# Patient Record
Sex: FEMALE | Race: WHITE | NOT HISPANIC OR LATINO | Employment: PART TIME | URBAN - METROPOLITAN AREA
[De-identification: names, ages, dates, MRNs, and addresses within clinical notes are randomized per-mention and may not be internally consistent; named-entity substitution may affect disease eponyms.]

---

## 2018-01-08 PROCEDURE — 87086 URINE CULTURE/COLONY COUNT: CPT | Performed by: NURSE PRACTITIONER

## 2018-01-11 ENCOUNTER — TELEPHONE (OUTPATIENT)
Dept: URGENT CARE | Facility: CLINIC | Age: 48
End: 2018-01-11

## 2018-01-12 ENCOUNTER — TELEPHONE (OUTPATIENT)
Dept: URGENT CARE | Facility: CLINIC | Age: 48
End: 2018-01-12

## 2018-01-12 NOTE — TELEPHONE ENCOUNTER
Ms. Weathers called stating she is having an increased burning sensation with urination and would like to know if she can take OTC Azo, advised her urine culture was negative if she is having symptoms she needs to be re-evaluated, she states she is not willing to come back in. Advised to follow up with her PCP or in another clinic if possible.

## 2018-01-13 ENCOUNTER — TELEPHONE (OUTPATIENT)
Dept: URGENT CARE | Facility: CLINIC | Age: 48
End: 2018-01-13

## 2018-01-13 NOTE — TELEPHONE ENCOUNTER
Pt called stated she was seen Monday for UTI prescribed antibiotic and diflucan. Pt stated she is still having symptoms and requesting two more diflucan. Informed Pt if she was still having symptoms she would need to come back in again to get re-elevated or follow- up with her PCP. Pt verbalized understanding no further questions.

## 2018-11-02 PROCEDURE — 87086 URINE CULTURE/COLONY COUNT: CPT | Performed by: NURSE PRACTITIONER

## 2018-11-05 ENCOUNTER — TELEPHONE (OUTPATIENT)
Dept: URGENT CARE | Facility: CLINIC | Age: 48
End: 2018-11-05

## 2018-11-05 NOTE — TELEPHONE ENCOUNTER
Pt stated was seen for sinus infection and a UTI prescribed cefdinir was experiencing nausea due to the medication and wanted to know if she could discontinue the medication. Informed Pt her urine culture was normal no bacterial growth but if her sinus symptoms were not improving it would be best to continue the medication and we could call in medication for the nausea. Pt stated that was fine medication escript to [facility] [address] No further questions

## 2018-11-26 ENCOUNTER — OFFICE VISIT (OUTPATIENT)
Dept: OBSTETRICS AND GYNECOLOGY | Facility: CLINIC | Age: 48
End: 2018-11-26

## 2018-11-26 ENCOUNTER — LAB REQUISITION (OUTPATIENT)
Dept: LAB | Facility: HOSPITAL | Age: 48
End: 2018-11-26

## 2018-11-26 ENCOUNTER — APPOINTMENT (OUTPATIENT)
Dept: LAB | Facility: HOSPITAL | Age: 48
End: 2018-11-26

## 2018-11-26 VITALS
HEIGHT: 63 IN | DIASTOLIC BLOOD PRESSURE: 70 MMHG | WEIGHT: 127 LBS | BODY MASS INDEX: 22.5 KG/M2 | SYSTOLIC BLOOD PRESSURE: 118 MMHG

## 2018-11-26 DIAGNOSIS — N95.1 MENOPAUSAL SYMPTOMS: ICD-10-CM

## 2018-11-26 DIAGNOSIS — Z12.11 COLON CANCER SCREENING: Primary | ICD-10-CM

## 2018-11-26 DIAGNOSIS — Z00.00 ROUTINE GENERAL MEDICAL EXAMINATION AT A HEALTH CARE FACILITY: ICD-10-CM

## 2018-11-26 PROBLEM — Z98.890 H/O LEEP: Status: ACTIVE | Noted: 2018-11-26

## 2018-11-26 LAB
ESTRADIOL SERPL HS-MCNC: 81 PG/ML
PROGEST SERPL-MCNC: 4.57 NG/ML
TESTOST SERPL-MCNC: 89.23 NG/DL (ref 0–780.1)

## 2018-11-26 PROCEDURE — 84144 ASSAY OF PROGESTERONE: CPT | Performed by: OBSTETRICS & GYNECOLOGY

## 2018-11-26 PROCEDURE — 36415 COLL VENOUS BLD VENIPUNCTURE: CPT | Performed by: OBSTETRICS & GYNECOLOGY

## 2018-11-26 PROCEDURE — 99202 OFFICE O/P NEW SF 15 MIN: CPT | Performed by: OBSTETRICS & GYNECOLOGY

## 2018-11-26 PROCEDURE — 84403 ASSAY OF TOTAL TESTOSTERONE: CPT | Performed by: OBSTETRICS & GYNECOLOGY

## 2018-11-26 PROCEDURE — 82626 DEHYDROEPIANDROSTERONE: CPT | Performed by: OBSTETRICS & GYNECOLOGY

## 2018-11-26 PROCEDURE — 82670 ASSAY OF TOTAL ESTRADIOL: CPT | Performed by: OBSTETRICS & GYNECOLOGY

## 2018-11-26 NOTE — PROGRESS NOTES
Subjective   Chief Complaint   Patient presents with   • Gynecologic Exam     needs compound hormone refill     Bia Weathers is a 48 y.o. year old .  No LMP recorded. Patient is postmenopausal.  She presents to be seen because of a need to get compounded hormones refill.  She reports is been an insurance change and she can no longer see Dr. Fuller.  She reports she's been on these for 7 years.  Gets labs checked every 3-6 months.  It's been about 6 months since A levels have been checked.  Last period was in .  She has not tried any shots in the past for pellets.  Maybe tried pills but was still very díaz.  Try to go off these for about 3 months but became evil and had night sweats.  So she'll like to stay on these.  Has had no bleeding.  Dr. Fuller adjust the dose depending on hormone levels.  I asked if her primary care Alicia recheck cholesterol thyroid etc and she reported that they send her to her GYN to get that done.  That may be for convenience sake if she's drawn all these other labs.  I will try to get those so we can have those in her chart.  No current complaints about any vaginal dryness or pain during intercourse.  Currently  does not smoke or use illegal drugs.  6 drinks a month.  No other medications besides a compounded hormones.                    The following portions of the patient's history were reviewed and updated as appropriate:problem list, current medications and allergies -   hormones as below; no known drug allergies  She reports that 2 of her sisters and her mother had colon polyps.  I think she needs to have a colonoscopy although she does not seem too excited about that idea/[place].  Discussed reduction hopefully of risks for colon cancer.  Mother has history of breast cancer diagnosed at age 61.  Her mother is about 16 years older than her.  Point surgery she recalls as a LEEP in about  for mild dysplasia 3 vaginal deliveries at term 7+ pounds between  "1990, 1994, 2003  Medical history review is negative ×7 positive for depression/anxiety    system review negative ×9 positive for headaches nosebleeds fatigue      Review of Systems No complaints of her urine but she reports that her stool is \"not normal\" upon questioning may be some mucus in her stool.      Objective   /70   Ht 158.8 cm (62.5\")   Wt 57.6 kg (127 lb)   BMI 22.86 kg/m²     General:  well developed; well nourished  no acute distress  appears stated age   Skin:  No suspicious lesions seen   Thyroid: not examined   Lungs:  breathing is unlabored   Heart:  Not performed.   Abdomen: Not performed.   Pelvis: Not performed.     Lab Review   No data reviewed    Imaging   No data reviewed       Assessment   1. Menopausal symptoms. Much worse off compounded estrogen and progesterone testosterone DHEA.   Patient requests refill of estrogen 1 mg/progesterone 50 mg/testosterone 2. 5/DHEA #15 g this compounded at the [facility] in [facility].    2. Family history of polyps needs to have colonoscopy for starters.   3. Family history breast cancer in her mother diagnosed at age 61.  Patient reports she is up-to-date on mammograms done this year [facility].  4.   Reports she had a Pap test earlier this year 2018       Plan   1. I have handwritten a prescription for the above cream that she feels that the [facility] and [facility].  One refill   2.  see her back in 6 months for annual GYN exam  3. Check estrogen and progesterone testosterone levels;  sign a release of information to get labs have been done at primary care and Dr. Fuller.  4. Referral to gastroenterology for probable colonoscopy given family history and screening     Medications Rx this encounter:  No orders of the defined types were placed in this encounter.         This note was electronically signed.    Siddhartha Mcdermott MD  November 26, 2018    "

## 2018-11-28 ENCOUNTER — TELEPHONE (OUTPATIENT)
Dept: URGENT CARE | Facility: CLINIC | Age: 48
End: 2018-11-28

## 2018-11-29 ENCOUNTER — TELEPHONE (OUTPATIENT)
Dept: OBSTETRICS AND GYNECOLOGY | Facility: CLINIC | Age: 48
End: 2018-11-29

## 2018-11-29 LAB — DHEA SERPL-MCNC: 234 NG/DL (ref 31–701)

## 2018-11-29 RX ORDER — TIZANIDINE HYDROCHLORIDE 4 MG/1
1 CAPSULE, GELATIN COATED ORAL EVERY 8 HOURS SCHEDULED
COMMUNITY
Start: 2018-10-16 | End: 2019-07-22

## 2018-11-29 NOTE — TELEPHONE ENCOUNTER
----- Message from Siddhartha Mcdermott MD sent at 11/29/2018  3:23 PM EST -----  Rebecca please let Bia no her labs are okay.  I forgot to remove the progesterone from her compounded cream that's filled with a [facility] in [place].  Could you call them and let them know the progesterone from her estrogen/testosterone/DHEA cream.  Thank you

## 2018-11-29 NOTE — PROGRESS NOTES
Rebecca please let Bia no her labs are okay.  I forgot to remove the progesterone from her compounded cream that's filled with a [facility] in [place].  Could you call them and let them know the progesterone from her estrogen/testosterone/DHEA cream.  Thank you

## 2018-11-29 NOTE — TELEPHONE ENCOUNTER
Called and spoke with patient and advised her that I would verbally call in her hormone cream without the progesterone

## 2019-01-03 RX ORDER — SODIUM, POTASSIUM,MAG SULFATES 17.5-3.13G
1 SOLUTION, RECONSTITUTED, ORAL ORAL TAKE AS DIRECTED
Qty: 2 BOTTLE | Refills: 0 | Status: SHIPPED | OUTPATIENT
Start: 2019-01-03 | End: 2019-07-22

## 2019-07-22 ENCOUNTER — OFFICE VISIT (OUTPATIENT)
Dept: OBSTETRICS AND GYNECOLOGY | Facility: CLINIC | Age: 49
End: 2019-07-22

## 2019-07-22 VITALS
BODY MASS INDEX: 24.45 KG/M2 | RESPIRATION RATE: 16 BRPM | DIASTOLIC BLOOD PRESSURE: 70 MMHG | WEIGHT: 138 LBS | SYSTOLIC BLOOD PRESSURE: 116 MMHG

## 2019-07-22 DIAGNOSIS — N95.1 MENOPAUSAL SYMPTOMS: Primary | ICD-10-CM

## 2019-07-22 DIAGNOSIS — Z12.39 SCREENING FOR BREAST CANCER: ICD-10-CM

## 2019-07-22 PROBLEM — Z98.890 H/O LEEP: Status: RESOLVED | Noted: 2018-11-26 | Resolved: 2019-07-22

## 2019-07-22 PROCEDURE — 99213 OFFICE O/P EST LOW 20 MIN: CPT | Performed by: OBSTETRICS & GYNECOLOGY

## 2019-07-22 RX ORDER — ESTRADIOL 1 MG/1
1 TABLET ORAL DAILY
Qty: 30 TABLET | Refills: 3 | Status: SHIPPED | OUTPATIENT
Start: 2019-07-22 | End: 2019-11-08 | Stop reason: SDUPTHER

## 2019-07-22 RX ORDER — LURASIDONE HYDROCHLORIDE 80 MG/1
80 TABLET, FILM COATED ORAL 2 TIMES DAILY
COMMUNITY
End: 2019-11-19

## 2019-07-22 RX ORDER — DOXEPIN HYDROCHLORIDE 25 MG/1
25 CAPSULE ORAL NIGHTLY
COMMUNITY
End: 2019-11-19

## 2019-07-22 RX ORDER — BUSPIRONE HYDROCHLORIDE 10 MG/1
10 TABLET ORAL 3 TIMES DAILY
COMMUNITY
End: 2019-11-19

## 2019-07-22 NOTE — PROGRESS NOTES
"Subjective   Chief Complaint   Patient presents with   • Follow-up     hormones   • Hot Flashes     trouble sleeping / hormone cream not working     iBa Weathers is a 48 y.o. year old .  No LMP recorded. Patient is postmenopausal.  She presents to be seen because of terrible hot flashes \"on fire\" night sweats - cant sleep every hour last few seconds clothes wet - cant change a work wlaks around wet   She stopped creams 3 months ago gave up $$ and not helping; effecting ADL. 9 years of menopause   Active with House[facility] at [facility]     No LMP recorded. Patient is postmenopausal.                              The following portions of the patient's history were reviewed and updated as appropriate:She  has a past medical history of Depression and Hypertension.  She does not have any pertinent problems on file.  She  has a past surgical history that includes LEEP ().  Her family history is not on file.  She is allergic to amoxicillin.    Current Outpatient Medications:   •  amphetamine-dextroamphetamine (ADDERALL) 20 MG tablet, Take 20 mg by mouth Daily., Disp: , Rfl:   •  busPIRone (BUSPAR) 10 MG tablet, Take 10 mg by mouth 3 (Three) Times a Day., Disp: , Rfl:   •  doxepin (SINEquan) 25 MG capsule, Take 25 mg by mouth Every Night., Disp: , Rfl:   •  Lurasidone HCl (LATUDA) 80 MG tablet, Take 80 mg by mouth 2 (Two) Times a Day., Disp: , Rfl:   •  topiramate (TOPAMAX) 200 MG tablet, Take  by mouth., Disp: , Rfl:   •  estradiol (ESTRACE) 1 MG tablet, Take 1 tablet by mouth Daily., Disp: 30 tablet, Rfl: 3  •  progesterone (PROMETRIUM) 200 MG capsule, Take 1 capsule by mouth Daily., Disp: 30 capsule, Rfl: 3  no or minimal alcohol, nonsmoker, caffeine use is moderate-to-high daily ~ 3 teas and brigitte   Review of Systems      Objective   /70   Resp 16   Wt 62.6 kg (138 lb)   Breastfeeding? No   BMI 24.45 kg/m²     General:  well developed; well nourished  no acute distress  appears stated age   Skin:  No " suspicious lesions seen   Thyroid: normal to inspection and palpation   Lungs:  breathing is unlabored   Heart:  Not performed.   Abdomen: soft, non-tender; no masses  no umbilical or inguinal hernias are present  no hepato-splenomegaly   Pelvis: Not performed.     Lab Review   FSH and hormones     Imaging   No data reviewed       Assessment   1. Menopausal symptoms interfering ADL   2.   3.        Plan   1.start HRT for symptoms  2.      No orders of the defined types were placed in this encounter.    New Medications Ordered This Visit   Medications   • estradiol (ESTRACE) 1 MG tablet     Sig: Take 1 tablet by mouth Daily.     Dispense:  30 tablet     Refill:  3   • progesterone (PROMETRIUM) 200 MG capsule     Sig: Take 1 capsule by mouth Daily.     Dispense:  30 capsule     Refill:  3            I spent a total of 15 minutes face-to-face with this patient and greater than half the time was in counseling the patient.  This note was electronically signed.    Siddhartha Mcdermott MD  July 22, 2019

## 2019-11-08 ENCOUNTER — TELEPHONE (OUTPATIENT)
Dept: OBSTETRICS AND GYNECOLOGY | Facility: CLINIC | Age: 49
End: 2019-11-08

## 2019-11-08 RX ORDER — ESTRADIOL 1 MG/1
1 TABLET ORAL DAILY
Qty: 30 TABLET | Refills: 0 | Status: SHIPPED | OUTPATIENT
Start: 2019-11-08 | End: 2019-11-11 | Stop reason: SDUPTHER

## 2019-11-08 NOTE — TELEPHONE ENCOUNTER
Pt is calling progesterone and her estradiol needs refill today  - I advised possible would be Monday - she advised she is out - I then again told her how important it is to not wait - possibly when she has a week left to call and advise

## 2019-11-11 RX ORDER — PROPRANOLOL HYDROCHLORIDE 20 MG/1
TABLET ORAL
COMMUNITY
Start: 2019-10-19 | End: 2022-01-27

## 2019-11-11 RX ORDER — ESTRADIOL 1 MG/1
1 TABLET ORAL DAILY
Qty: 30 TABLET | Refills: 0 | Status: SHIPPED | OUTPATIENT
Start: 2019-11-11 | End: 2019-11-19 | Stop reason: SDUPTHER

## 2019-11-11 RX ORDER — DEXTROAMPHETAMINE SACCHARATE, AMPHETAMINE ASPARTATE, DEXTROAMPHETAMINE SULFATE AND AMPHETAMINE SULFATE 7.5; 7.5; 7.5; 7.5 MG/1; MG/1; MG/1; MG/1
1 TABLET ORAL 2 TIMES DAILY
Refills: 0 | COMMUNITY
Start: 2019-10-17

## 2019-11-11 RX ORDER — TOPIRAMATE 100 MG/1
TABLET, FILM COATED ORAL
Refills: 2 | COMMUNITY
Start: 2019-09-21 | End: 2019-11-19

## 2019-11-11 RX ORDER — BUSPIRONE HYDROCHLORIDE 30 MG/1
TABLET ORAL
Refills: 3 | COMMUNITY
Start: 2019-09-23 | End: 2020-11-20

## 2019-11-11 RX ORDER — CAPSAICIN 0.025 %
CREAM (GRAM) TOPICAL
Refills: 1 | COMMUNITY
Start: 2019-08-06 | End: 2019-11-19

## 2019-11-11 RX ORDER — CYCLOSPORINE 0.5 MG/ML
EMULSION OPHTHALMIC
COMMUNITY
Start: 2019-10-21 | End: 2020-11-20

## 2019-11-11 RX ORDER — LORATADINE 10 MG/1
TABLET ORAL DAILY
Refills: 2 | COMMUNITY
Start: 2019-09-14 | End: 2019-11-14 | Stop reason: SDUPTHER

## 2019-11-11 RX ORDER — IBUPROFEN 800 MG/1
TABLET ORAL
COMMUNITY
Start: 2019-10-19 | End: 2019-11-19

## 2019-11-11 RX ORDER — GABAPENTIN 300 MG/1
CAPSULE ORAL
Refills: 1 | COMMUNITY
Start: 2019-09-16 | End: 2019-11-19

## 2019-11-11 RX ORDER — TRIAMCINOLONE ACETONIDE 1 MG/G
CREAM TOPICAL
Refills: 0 | COMMUNITY
Start: 2019-09-24 | End: 2019-11-19

## 2019-11-19 ENCOUNTER — OFFICE VISIT (OUTPATIENT)
Dept: OBSTETRICS AND GYNECOLOGY | Facility: CLINIC | Age: 49
End: 2019-11-19

## 2019-11-19 VITALS
BODY MASS INDEX: 23.04 KG/M2 | HEIGHT: 63 IN | DIASTOLIC BLOOD PRESSURE: 70 MMHG | WEIGHT: 130 LBS | SYSTOLIC BLOOD PRESSURE: 108 MMHG

## 2019-11-19 DIAGNOSIS — Z01.419 ENCOUNTER FOR GYNECOLOGICAL EXAMINATION WITHOUT ABNORMAL FINDING: Primary | ICD-10-CM

## 2019-11-19 DIAGNOSIS — N94.10 DYSPAREUNIA, FEMALE: ICD-10-CM

## 2019-11-19 DIAGNOSIS — F52.0 HYPOACTIVE SEXUAL DESIRE DISORDER: ICD-10-CM

## 2019-11-19 PROCEDURE — 99396 PREV VISIT EST AGE 40-64: CPT | Performed by: OBSTETRICS & GYNECOLOGY

## 2019-11-19 RX ORDER — ESTRADIOL 1 MG/1
1 TABLET ORAL DAILY
Qty: 30 TABLET | Refills: 11 | Status: SHIPPED | OUTPATIENT
Start: 2019-11-19 | End: 2020-11-20 | Stop reason: SDUPTHER

## 2019-11-19 RX ORDER — LURASIDONE HYDROCHLORIDE 120 MG/1
TABLET, FILM COATED ORAL
Refills: 2 | COMMUNITY
Start: 2019-08-29 | End: 2022-05-31

## 2019-11-19 RX ORDER — PHENOL 1.4 %
600 AEROSOL, SPRAY (ML) MUCOUS MEMBRANE DAILY
COMMUNITY
End: 2020-11-20

## 2019-11-19 RX ORDER — ESTRADIOL 0.1 MG/G
CREAM VAGINAL
Qty: 1 EACH | Refills: 4 | Status: SHIPPED | OUTPATIENT
Start: 2019-11-19 | End: 2020-11-20

## 2019-11-19 NOTE — PROGRESS NOTES
Subjective   Chief Complaint   Patient presents with   • Annual Exam     no sex drive     Bia Badillo is a 49 y.o. year old  menopausal female presenting to be seen for her annual exam.  There has not been vaginal bleeding in the last 12 months.  Hot flashes and night sweats are not a significant problem.    SEXUAL Hx:  She is sexually active.  Vaginal dryness is a problem.  [place] is painful:yes she is with her ex- off and on; she is concerned with lack of sexual desire - only person she would have sex with   She is very concerned with this.  Told her that she may need testosterone and it is not always effective.  I would like to treat the painful intercourse as if her intercourse hurts she may be less interested as well.  This seems to be a big problem for her although she is not currently regularly sexually active or with her  on full-time basis.  She has concerns about domestic violence: no    HEALTH Hx:  She exercises regularly: yes.  She wears her seat belt:yes.  Self breast awareness: yes  She has noticed changes in height: no              Calcium intake is adequate 4 daily              Caffeine intake: no or mild caffeine use    The following portions of the patient's history were reviewed and updated as appropriate:problem list, current medications, allergies, past family history, past medical history, past social history and past surgical history.    Current Outpatient Medications:   •  amphetamine-dextroamphetamine (ADDERALL) 30 MG tablet, Take 1 tablet by mouth 2 (Two) Times a Day., Disp: , Rfl: 0  •  busPIRone (BUSPAR) 30 MG tablet, , Disp: , Rfl: 3  •  calcium carbonate (OS-CHARLES) 600 MG tablet, Take 600 mg by mouth Daily., Disp: , Rfl:   •  cephalexin (KEFLEX) 500 MG capsule, Take 1 capsule by mouth 3 (Three) Times a Day for 7 days., Disp: 21 capsule, Rfl: 0  •  cetirizine (zyrTEC) 10 MG tablet, Take 1 tablet by mouth Daily for 30 days., Disp: 30 tablet, Rfl: 0  •   "estradiol (ESTRACE) 1 MG tablet, Take 1 tablet by mouth Daily., Disp: 30 tablet, Rfl: 11  •  fluconazole (DIFLUCAN) 150 MG tablet, May take 1 tablet now. May take 2nd tablet in 3 days if symptoms persist., Disp: 4 tablet, Rfl: 0  •  FLUTICASONE PROPIONATE HFA IN, SHAKE LQ AND U 2 SPRAYS IEN QD, Disp: , Rfl: 2  •  LATUDA 120 MG tablet, TK 1 T PO QD AT 9 PM, Disp: , Rfl: 2  •  progesterone (PROMETRIUM) 200 MG capsule, Take 1 capsule by mouth Daily., Disp: 30 capsule, Rfl: 11  •  propranolol (INDERAL) 20 MG tablet, , Disp: , Rfl:   •  RESTASIS 0.05 % ophthalmic emulsion, , Disp: , Rfl:   •  topiramate (TOPAMAX) 200 MG tablet, Take  by mouth., Disp: , Rfl:   •  estradiol (ESTRACE VAGINAL) 0.1 MG/GM vaginal cream, Insert 1 gm intravaginally 2 nights weekly, Disp: 1 each, Rfl: 4    Social History    Tobacco Use      Smoking status: Never Smoker      Smokeless tobacco: Never Used    Social History     Substance and Sexual Activity   Alcohol Use Yes   • Frequency: Monthly or less       Review of systems  Constitutional   POS nothing reported                           NEG fevers, malaise and night sweats  Breast               POS nothing reported                           NEG persistent breast lump, skin dimpling or nipple discharge  GI                     POS nothing reported                           NEG bloating, change in bowel habits, melena or reflux symptoms                      POS occasionally has to strain to void                           NEG dysuria, frequency or hematuria         Objective   /70   Ht 160 cm (63\")   Wt 59 kg (130 lb)   Breastfeeding? No   BMI 23.03 kg/m²      General:  well developed; well nourished  no acute distress  appears stated age   Skin:  No suspicious lesions seen   Thyroid: normal to inspection and palpation   Breasts:  Examined in supine position  Symmetric without masses or skin dimpling  Nipples normal without inversion, lesions or discharge  Bilateral implants are noted " without obvious palpable abnormalities   Abdomen: soft, non-tender; no masses  no umbilical or inguinal hernias are present  no hepato-splenomegaly   Pelvis: Clinical staff was present for exam  External genitalia:  normal appearance of the external genitalia including Bartholin's and [place]'s glands.  :  urethral meatus normal;  Vaginal:  normal pink mucosa without prolapse or lesions.  Uterus:  normal size, shape and consistency.  Adnexa:  non palpable bilaterally.       Lab Review   Estradiol DHEA testosterone levels  Imaging review  No data reviewed         Assessment   1. Postmenopausal symptoms have improved on Estrace and Prometrium.  However she has  2. Hypoactive sexual desire discussed that this is a complicated matter with no simple cure   3. Dyspareunia due to vaginal dryness we will treat with addition of Estrace cream twice weekly  4. Family history of breast cancer her mother need       Plan   1. Annual or sooner as needed  2. Calcium discussed  1200 mg daily in divided doses ideally in diet  3. Regular weight bearing exercise  4. Breast self awareness, mammograms discussed  5. We will see how the Estrace cream helps as far as painful intercourse.    New Medications Ordered This Visit   Medications   • estradiol (ESTRACE) 1 MG tablet     Sig: Take 1 tablet by mouth Daily.     Dispense:  30 tablet     Refill:  11   • progesterone (PROMETRIUM) 200 MG capsule     Sig: Take 1 capsule by mouth Daily.     Dispense:  30 capsule     Refill:  11   • estradiol (ESTRACE VAGINAL) 0.1 MG/GM vaginal cream     Sig: Insert 1 gm intravaginally 2 nights weekly     Dispense:  1 each     Refill:  4      No orders of the defined types were placed in this encounter.             This note was electronically signed.    Siddhartha Mcdermott M.D.  November 19, 2019

## 2020-01-10 ENCOUNTER — TELEPHONE (OUTPATIENT)
Dept: URGENT CARE | Facility: CLINIC | Age: 50
End: 2020-01-10

## 2020-03-03 ENCOUNTER — TELEPHONE (OUTPATIENT)
Dept: OBSTETRICS AND GYNECOLOGY | Facility: CLINIC | Age: 50
End: 2020-03-03

## 2020-03-03 NOTE — TELEPHONE ENCOUNTER
"If she is using Estrace and Prometrium on a regular basis then her hormones are not \"out of balance \"and there is really no need to do blood work if she is not having hot flashes night sweats etc."

## 2020-03-03 NOTE — TELEPHONE ENCOUNTER
PT IS CALLING SHE HAS BEEN GOING TO THE DENTIST  THE DENTIST SEEMS TO THINK THAT NICOLE'S HORMONES ARE OUT OF BALANCE  THIS BEING THE REASON FOR SOME OF HER TEETH ISSUES- SHE IS WANTING THE PATIENT TO GET HER LAB WORK DONE BEFORE HER NEXT VISIT - - CAN DR LUCIANO DO THIS FOR HER?    IF THIS IS OK - CAN YOU PLEASE CALL NICOLE AND  LET HER KNOW IF OK AND SHE WILL GO .  [phone]

## 2020-03-20 ENCOUNTER — TELEPHONE (OUTPATIENT)
Dept: OBSTETRICS AND GYNECOLOGY | Facility: CLINIC | Age: 50
End: 2020-03-20

## 2020-03-20 NOTE — TELEPHONE ENCOUNTER
Patient has gained 20 pounds in the last 6 months. On estrace and progesterone.  Could the hormones cause this.  Blood pressure is up.

## 2020-09-15 ENCOUNTER — TELEPHONE (OUTPATIENT)
Dept: OBSTETRICS AND GYNECOLOGY | Facility: CLINIC | Age: 50
End: 2020-09-15

## 2020-09-15 NOTE — TELEPHONE ENCOUNTER
PT IS CALLING CAME TO YOU AND WAS ON A HORMONAL CREAM CLICKER THAT SHE WOUILD RUB INTO HER WRIST - SHE HAD DECIDED WANTED TO TRY THE PILL - SO WE PUT HER ON THE PILL- SHE IS NOW DECIDED THAT SHE WANTS TO GO BACK ON THE CREAM ---- THE SAME CREAM THAT SHE WAS ON - NOTING NEW - THE CLICKER FOR THE WRIST CREAM  PLEASE CALL THIS INTO THE COMPOUNDED PHARMACY -  [facility]

## 2020-09-15 NOTE — TELEPHONE ENCOUNTER
Estradiol 1 mg  Testosterone 2.5 mg  Dhea 20 mg applying at bedtime mixed in a cream and applied at qhs.

## 2020-09-15 NOTE — TELEPHONE ENCOUNTER
Could you please find out whether this is testosterone /estrogen/ progesterone what exactly it was.  Thank you

## 2020-09-15 NOTE — TELEPHONE ENCOUNTER
I have not seen her in over a year so I cannot prescribe this medication without at least a video visit or telephone visit.  In person would be required eventually.  Looks like she has it scheduled in November.  Thank you

## 2020-11-20 ENCOUNTER — OFFICE VISIT (OUTPATIENT)
Dept: OBSTETRICS AND GYNECOLOGY | Facility: CLINIC | Age: 50
End: 2020-11-20

## 2020-11-20 VITALS
HEIGHT: 63 IN | DIASTOLIC BLOOD PRESSURE: 70 MMHG | BODY MASS INDEX: 25.52 KG/M2 | SYSTOLIC BLOOD PRESSURE: 114 MMHG | WEIGHT: 144 LBS

## 2020-11-20 DIAGNOSIS — Z12.31 ENCOUNTER FOR SCREENING MAMMOGRAM FOR MALIGNANT NEOPLASM OF BREAST: ICD-10-CM

## 2020-11-20 DIAGNOSIS — Z01.419 ENCOUNTER FOR WELL WOMAN EXAM WITH ROUTINE GYNECOLOGICAL EXAM: Primary | ICD-10-CM

## 2020-11-20 PROCEDURE — 99396 PREV VISIT EST AGE 40-64: CPT | Performed by: OBSTETRICS & GYNECOLOGY

## 2020-11-20 RX ORDER — CHLORZOXAZONE 500 MG/1
TABLET ORAL
COMMUNITY
Start: 2020-10-19

## 2020-11-20 RX ORDER — DICYCLOMINE HCL 20 MG
TABLET ORAL
COMMUNITY
Start: 2020-11-08 | End: 2021-09-23

## 2020-11-20 RX ORDER — ESTRADIOL 1 MG/1
1 TABLET ORAL DAILY
Qty: 30 TABLET | Refills: 11 | Status: SHIPPED | OUTPATIENT
Start: 2020-11-20 | End: 2022-02-04 | Stop reason: SDUPTHER

## 2020-11-20 RX ORDER — NABUMETONE 500 MG/1
TABLET, FILM COATED ORAL
COMMUNITY
Start: 2020-10-16 | End: 2021-09-23

## 2020-11-20 RX ORDER — CALCIUM POLYCARBOPHIL 625 MG/1
TABLET, FILM COATED ORAL DAILY
COMMUNITY
Start: 2020-11-06 | End: 2021-09-23

## 2020-11-20 RX ORDER — DEXTROAMPHETAMINE SACCHARATE, AMPHETAMINE ASPARTATE, DEXTROAMPHETAMINE SULFATE AND AMPHETAMINE SULFATE 2.5; 2.5; 2.5; 2.5 MG/1; MG/1; MG/1; MG/1
TABLET ORAL
COMMUNITY
Start: 2020-10-16

## 2020-11-20 RX ORDER — BUPROPION HYDROCHLORIDE 150 MG/1
TABLET ORAL
COMMUNITY
Start: 2020-10-29 | End: 2021-09-23

## 2020-11-20 RX ORDER — TOPIRAMATE 100 MG/1
TABLET, FILM COATED ORAL
COMMUNITY
Start: 2020-09-07 | End: 2021-09-23

## 2020-11-20 NOTE — PROGRESS NOTES
Subjective   Chief Complaint   Patient presents with   • Annual Exam     cramping   • Constipation     Bia Badillo is a 50 y.o. year old  menopausal female presenting to be seen for her annual exam.  There has not been vaginal bleeding in the last 12 months.  Hot flashes and night sweats are not a significant problem.  Since we started her on Estrace and Prometrium.    Upon review she has never had a colonoscopy and upon questioning 2 younger sisters have had colon polyps.  Strongly encouraged her to keep her appointment for the colonoscopy.  She reports had a mammogram at [facility] last year but I have not received those results I will send a paper order with her today so we can get those.  We will do Pap testing today to get that caught up-to-date.    SEXUAL Hx:  She is not sexually active.  Vaginal dryness is not a problem.  [place] is painful:not asked  She has concerns about domestic violence: no    HEALTH Hx:  She exercises regularly: no.  She wears her seat belt:yes.  Self breast awareness: yes  She has noticed changes in height: no              Calcium intake is not adequate 2 daily              Caffeine intake: no or mild caffeine use    The following portions of the patient's history were reviewed and updated as appropriate:problem list, current medications, allergies, past family history, past medical history, past social history and past surgical history.      Current Outpatient Medications:   •  amphetamine-dextroamphetamine (ADDERALL) 10 MG tablet, TK 1 T PO TID, Disp: , Rfl:   •  amphetamine-dextroamphetamine (ADDERALL) 30 MG tablet, Take 1 tablet by mouth 2 (Two) Times a Day., Disp: , Rfl: 0  •  buPROPion XL (WELLBUTRIN XL) 150 MG 24 hr tablet, TK 1 T PO  QD UPON AWAKENING, Disp: , Rfl:   •  chlorzoxazone (PARAFON FORTE) 500 MG tablet, TK 1 T PO UP TO TID PRF MSP OR PAIN, Disp: , Rfl:   •  dicyclomine (BENTYL) 20 MG tablet, TK 1 T PO UP TO QID HS AND WC PRF ABD CRAMPS, Disp: ,  "Rfl:   •  estradiol (ESTRACE) 1 MG tablet, Take 1 tablet by mouth Daily., Disp: 30 tablet, Rfl: 11  •  Fiber-Lax 625 MG tablet, Take  by mouth Daily., Disp: , Rfl:   •  LATUDA 120 MG tablet, TK 1 T PO QD AT 9 PM, Disp: , Rfl: 2  •  nabumetone (RELAFEN) 500 MG tablet, TK 1 TO 2 TS PO UP TO BID PRF PAIN, Disp: , Rfl:   •  progesterone (PROMETRIUM) 200 MG capsule, Take 1 capsule by mouth Daily., Disp: 30 capsule, Rfl: 11  •  propranolol (INDERAL) 20 MG tablet, , Disp: , Rfl:   •  topiramate (TOPAMAX) 100 MG tablet, TK 1 AND 1/2 TS PO QD, Disp: , Rfl:   •  topiramate (TOPAMAX) 200 MG tablet, Take  by mouth., Disp: , Rfl:     Social History    Tobacco Use      Smoking status: Never Smoker      Smokeless tobacco: Never Used    Social History     Substance and Sexual Activity   Alcohol Use Not Currently   • Frequency: Monthly or less       Review of systems  Constitutional   POS weight gain                           NEG chills, fatigue, fevers and malaise  Breast               POS nothing reported                           NEG persistent breast lump, skin dimpling or nipple discharge  GI                     POS abdominal pain, bloating and reflux symptoms                           NEG change in bowel habits, melena or reflux symptoms                      POS RAVINDER is present but it IS NOT effecting her ADL's                           NEG dysuria, frequency or hematuria           Objective   /70   Ht 158.8 cm (62.5\")   Wt 65.3 kg (144 lb)   Breastfeeding No   BMI 25.92 kg/m²      General:  well developed; well nourished  no acute distress  appears stated age   Skin:  No suspicious lesions seen   Thyroid: not examined   Breasts:  Examined in supine position  Symmetric without masses or skin dimpling  Nipples normal without inversion, lesions or discharge  Bilateral implants are noted without obvious palpable abnormalities   Abdomen: soft, non-tender; no masses  no umbilical or inguinal hernias are present  no " hepato-splenomegaly   Pelvis: Clinical staff was present for exam  External genitalia:  normal appearance of the external genitalia including Bartholin's and [place]'s glands.  :  urethral meatus normal;  Vaginal:  atrophic mucosal changes are present; she is able to perform a Kegel contraction upon request;  Cervix:  normal appearance. Pap obtained  Uterus:  normal size, shape and consistency.  Adnexa:  non palpable bilaterally.  Rectal:  digital rectal exam not performed; anus visually normal appearing.       Lab Review   Estrogen testosterone etc. old labs  Imaging review  Nothing reviewed               Assessment     1. Postmenopausal examination doing well with exception of complaining of some itching related to sweating at work.  She wears scrubs.  Wondered about powders I would urge her to use something like zinc oxide or vitamin a and D ointment which is much more bland and benign.  Avoid nylon underwear or tight fitting clothing  2. No significant menopausal symptoms on estrogen progesterone replacement therapy       Plan     1. Annual or sooner as needed  2. Calcium discussed  1200 mg daily in divided doses ideally in diet  3. Regular weight bearing exercise  4. Breast self awareness and mammograms discussed  5. Colonoscopy discussed  6.     New Medications Ordered This Visit   Medications   • progesterone (PROMETRIUM) 200 MG capsule     Sig: Take 1 capsule by mouth Daily.     Dispense:  30 capsule     Refill:  11   • estradiol (ESTRACE) 1 MG tablet     Sig: Take 1 tablet by mouth Daily.     Dispense:  30 tablet     Refill:  11      Orders Placed This Encounter   Procedures   • Mammo Screening Digital Tomosynthesis Bilateral With CAD     Standing Status:   Future     Standing Expiration Date:   5/19/2021     Order Specific Question:   Reason for Exam:     Answer:   Screening              This note was electronically signed.    Siddhartha Mcdermott M.D.  November 20, 2020

## 2020-11-30 RX ORDER — ESTRADIOL 1 MG/1
1 TABLET ORAL DAILY
Qty: 30 TABLET | Refills: 11 | OUTPATIENT
Start: 2020-11-30

## 2021-09-22 NOTE — PROGRESS NOTES
[facility] [place] [facility]  [address]  [place] [address]  [phone]    Name: Bia Badillo  : 1970  Patient Care Team:  Blayne Nova APRN as PCP - General (Family Medicine)    Chief Complaint   Patient presents with   • Consult   • Obesity      HPI:   Ms. Bia Badillo is a 51 y.o. female presents for initiation of medically supervised weight loss. She has tried other methods/programs to lose weight including:  Joining a gym and has not ever tried medication to assist with weight loss..      Lifetime non-pregnant maximum weight: 123  Weight 1 year ago: 140  Weight 5 years ago: 120  The following have contributed to weight gain: unknown    Recent Weight History:   Wt Readings from Last 6 Encounters:   21 67.8 kg (149 lb 8 oz)   20 65.3 kg (144 lb)   10/08/20 63.5 kg (140 lb)   19 59 kg (130 lb)   19 59 kg (130 lb)   19 59 kg (130 lb)       Review of Systems:   Review of Systems   Constitutional: Positive for fatigue.        Positive for weight gain   HENT: Negative for trouble swallowing.         Negative for throat swelling   Respiratory: Negative for shortness of breath and wheezing.         Negative for snoring   Cardiovascular: Negative for chest pain, palpitations and leg swelling.   Gastrointestinal: Negative for constipation, diarrhea, GERD and indigestion. Abdominal pain: stomach bloating --GI appt .   Endocrine: Negative for cold intolerance, heat intolerance, polydipsia, polyphagia and polyuria.        Negative for loss of hair  Negative for hirsutism     Genitourinary:        Denies menstrual irregularities   Musculoskeletal: Negative for arthralgias.        Denies exercise limitations  Denies chronic pain   Skin: Negative for dry skin.        Negative for acne   Neurological: Negative for headache and memory problem.        Negative for paresthesias   Psychiatric/Behavioral: Positive for sleep disturbance  "(on medication) and depressed mood (patient lost her daughter 2021/she was 28 overdose). Negative for self-injury and suicidal ideas. The patient is not nervous/anxious.        No Known Allergies    Exercise:      Current FITT Score      Frequency   Intensity Time Strength Training   []   0 None  []   0 None  []   0 None  [x]   0 None    []   1 (1-2x/week) []   1 (light) []   1 (<10 min) []   1 (1x/week)   [x]   2 (3-5x/week) [x]   2 (moderate) []   2 (10-20 min) []   2 (2x/week)   []   3 (daily)   []   3 (moderately hard)  []   4 (very hard) []   3 (20-30 min)  [x]   4 (>30 min) []   3 (3-4x/week)       Water:60 oz/day  Alcohol: CAGE is negative   Other beverages:  soda  2 per day  PHQ-9 Total Score:  8    VS   Vitals:    09/23/21 0841   BP: 126/76   BP Location: Left arm   Patient Position: Sitting   Cuff Size: Adult   Pulse: 69   Resp: 18   Temp: 97.1 °F (36.2 °C)   TempSrc: Temporal   SpO2: 98%   Weight: 67.8 kg (149 lb 8 oz)   Height: 160 cm (63\")       Start Weight/BMI: 149.5  %fat: 41.8  Total body water (in lbs): 63.5    Measurements (in inches)  Neck: 12  Chest: 36.3  Waist: 33.5  Hips: 38.5  Thighs: 34    Physical Exam  Vitals reviewed.   Constitutional:       Appearance: She is obese. She is not ill-appearing.   HENT:      Head:      Comments: masked  Neck:      Thyroid: No thyroid mass, thyromegaly or thyroid tenderness.   Cardiovascular:      Rate and Rhythm: Normal rate and regular rhythm.   Pulmonary:      Effort: Pulmonary effort is normal.      Breath sounds: Normal breath sounds.   Neurological:      Mental Status: She is alert.   Psychiatric:         Attention and Perception: Attention and perception normal.         Behavior: Behavior is cooperative.        ASSESSMENT/PLAN:   Patient viewed educational videos. Topics included obesity as a disease, nutritional education on food groups, exercise, and medications. Patient was instructed in adequate protein, controlled carb and controlled fat intake. "   Patient received instructions on using the medicines as a tool in controlling their weight with nutritional and behavioral changes. Risks and benefits were discussed. I believe the potential benefits of medication helping to decrease weight outweighs the risks. Patient is to try nutritonal/behavioral changes only first.   Patient received our clinic education booklet.   Our patient consent form was reviewed including potential risks of weight loss. We also reviewed our confidentiality and HIPPA statements. Patients current FITT score was reviewed along with current capability for exercise tolerance and a patient will work towards a FITT score of:     Frequency   Intensity Time Strength Training   []   0 None  []   0 None  []   0 None  []   0 None    []   1 (1-2x/week) []   1 (light) []   1 (<10 min) []   1 (1x/week)   [x]   2 (3-5x/week) [x]   2 (moderate) []   2 (10-20 min) [x]   2 (2x/week)   []   3 (daily)   []   3 (moderately hard)  []   4 (very hard) []   3 (20-30 min)  [x]   4 (>30 min) []   3 (3-4x/week)       Patient's past medical history was reviewed in detail and barriers to weight loss were identified and discussed. Past efforts at weight reduction on their own as well as under physician supervision were documented and discussed.  I advised patient to continue routine care with their Primary Care Provider.     Nutritional recommendations and goals were reviewed including Calories: daily adjusted for exercise calories burnt, Protein:35% daily, Net carbs (total carb - fiber) 30% daily.    Diagnoses and all orders for this visit:    1. Overweight (BMI 25.0-29.9) (Primary)  Assessment & Plan:  Patient's (Body mass index is 26.48 kg/m².) indicates that they are overweight with health conditions that include hypertension . Weight is newly identified. BMI is is above average; BMI management plan is completed.    --Discussed that Propranolol could we a weight causing medication. She uses only for blood  pressure control and has no underlying arrhythmia or tachycardia that she knows of.  Plans to discuss with primary care provider for alternative class of blood pressure medication chest lisinopril    --My fitness pal is in office today and body composition analysis gone over.  Current basal metabolic rate is 1354.  Calorie goal set to 950 adjusted for calories and activity.  Her #1 goal is going to be to start tracking her foods 100% including condiments.  Advised to bring in her food journal to next office visit.  --Fasting labs completed in office  --Is interested in pharmacology.  Advised that we will follow-up in 2 weeks and after reviewing labs will discuss and initiate if appropriate.    Orders:  -     Insulin, Total  -     Hemoglobin A1c  -     Comprehensive Metabolic Panel  -     CBC & Differential  -     T3, Free  -     T4, Free  -     TSH  -     Vitamin D 25 Hydroxy  -     Lipid Panel    2. Encounter for drug screening  -     Urine Drug Screen - Urine, Clean Catch       Treatment Plan  Non-Weight Loss Goals: Improved blood pressure: has been addressed., Improved mobility: has been addressed.  and Improved energy: has been addressed.   Initial goal of 5-10% loss of beginning body weight    Goals:  1. Personal Goals: 125  2. Start changing nutrition to examples given to meet nutritional macronutrient individual ranges discussed. Titrate down 500 calories every 5 days as tolerated until range met. Titrate down 50g carbohydrates every 5 days as tolerated until range met. Inc exercise to the individualized FITT score discussed. Start to keep a food journal and bring into next visit in 2 weeks for review. Practice the behavioral modification technique of mindful eating. Take one MVI daily and 2000mg fish oil daily. Take other medications and supplements as directed.    I spent 75 minutes on this date of service. This time includes time spent by me in the following activities:preparing for the visit, counseling  and educating the patient/family/caregiver, ordering medications, tests, or procedures and documenting information in the medical record.    Return in about 2 weeks (around 10/7/2021).      JESSICA Magaña

## 2021-09-23 ENCOUNTER — OFFICE VISIT (OUTPATIENT)
Dept: BARIATRICS/WEIGHT MGMT | Facility: CLINIC | Age: 51
End: 2021-09-23

## 2021-09-23 VITALS
BODY MASS INDEX: 26.49 KG/M2 | DIASTOLIC BLOOD PRESSURE: 76 MMHG | SYSTOLIC BLOOD PRESSURE: 126 MMHG | TEMPERATURE: 97.1 F | WEIGHT: 149.5 LBS | HEIGHT: 63 IN | RESPIRATION RATE: 18 BRPM | OXYGEN SATURATION: 98 % | HEART RATE: 69 BPM

## 2021-09-23 DIAGNOSIS — Z02.83 ENCOUNTER FOR DRUG SCREENING: ICD-10-CM

## 2021-09-23 DIAGNOSIS — E66.3 OVERWEIGHT (BMI 25.0-29.9): Primary | ICD-10-CM

## 2021-09-23 PROBLEM — E66.9 OBESITY: Status: RESOLVED | Noted: 2021-09-23 | Resolved: 2021-09-23

## 2021-09-23 PROBLEM — E66.9 OBESITY: Status: ACTIVE | Noted: 2021-09-23

## 2021-09-23 PROCEDURE — 99215 OFFICE O/P EST HI 40 MIN: CPT | Performed by: NURSE PRACTITIONER

## 2021-09-23 PROCEDURE — 99417 PROLNG OP E/M EACH 15 MIN: CPT | Performed by: NURSE PRACTITIONER

## 2021-09-23 RX ORDER — MELOXICAM 7.5 MG/1
7.5 TABLET ORAL DAILY
COMMUNITY

## 2021-09-23 RX ORDER — TIZANIDINE 4 MG/1
4 TABLET ORAL NIGHTLY PRN
COMMUNITY
End: 2021-10-06

## 2021-09-23 RX ORDER — SULFAMETHOXAZOLE AND TRIMETHOPRIM 800; 160 MG/1; MG/1
1 TABLET ORAL 2 TIMES DAILY
COMMUNITY
Start: 2021-09-17 | End: 2021-10-06

## 2021-09-23 RX ORDER — TRAZODONE HYDROCHLORIDE 50 MG/1
50 TABLET ORAL AS NEEDED
COMMUNITY
Start: 2021-09-03 | End: 2022-01-27

## 2021-09-23 NOTE — ASSESSMENT & PLAN NOTE
Patient's (Body mass index is 26.48 kg/m².) indicates that they are overweight with health conditions that include hypertension . Weight is newly identified. BMI is is above average; BMI management plan is completed.    --Discussed that Propranolol could we a weight causing medication. She uses only for blood pressure control and has no underlying arrhythmia or tachycardia that she knows of.  Plans to discuss with primary care provider for alternative class of blood pressure medication chest lisinopril    --My fitness pal is in office today and body composition analysis gone over.  Current basal metabolic rate is 1354.  Calorie goal set to 950 adjusted for calories and activity.  Her #1 goal is going to be to start tracking her foods 100% including condiments.  Advised to bring in her food journal to next office visit.  --Fasting labs completed in office  --Is interested in pharmacology.  Advised that we will follow-up in 2 weeks and after reviewing labs will discuss and initiate if appropriate.

## 2021-09-24 LAB
25(OH)D3+25(OH)D2 SERPL-MCNC: 26.6 NG/ML (ref 30–100)
ALBUMIN SERPL-MCNC: 4.6 G/DL (ref 3.5–5.2)
ALBUMIN/GLOB SERPL: 2 G/DL
ALP SERPL-CCNC: 49 U/L (ref 39–117)
ALT SERPL-CCNC: 16 U/L (ref 1–33)
AST SERPL-CCNC: 17 U/L (ref 1–32)
BASOPHILS # BLD AUTO: 0.07 10*3/MM3 (ref 0–0.2)
BASOPHILS NFR BLD AUTO: 1.1 % (ref 0–1.5)
BILIRUB SERPL-MCNC: <0.2 MG/DL (ref 0–1.2)
BUN SERPL-MCNC: 8 MG/DL (ref 6–20)
BUN/CREAT SERPL: 6.6 (ref 7–25)
CALCIUM SERPL-MCNC: 9.5 MG/DL (ref 8.6–10.5)
CHLORIDE SERPL-SCNC: 106 MMOL/L (ref 98–107)
CHOLEST SERPL-MCNC: 153 MG/DL (ref 0–200)
CO2 SERPL-SCNC: 20.3 MMOL/L (ref 22–29)
CREAT SERPL-MCNC: 1.22 MG/DL (ref 0.57–1)
EOSINOPHIL # BLD AUTO: 0.15 10*3/MM3 (ref 0–0.4)
EOSINOPHIL NFR BLD AUTO: 2.3 % (ref 0.3–6.2)
ERYTHROCYTE [DISTWIDTH] IN BLOOD BY AUTOMATED COUNT: 11.8 % (ref 12.3–15.4)
GLOBULIN SER CALC-MCNC: 2.3 GM/DL
GLUCOSE SERPL-MCNC: 85 MG/DL (ref 65–99)
HBA1C MFR BLD: 5.2 % (ref 4.8–5.6)
HCT VFR BLD AUTO: 39.2 % (ref 34–46.6)
HDLC SERPL-MCNC: 49 MG/DL (ref 40–60)
HGB BLD-MCNC: 13.4 G/DL (ref 12–15.9)
IMM GRANULOCYTES # BLD AUTO: 0.03 10*3/MM3 (ref 0–0.05)
IMM GRANULOCYTES NFR BLD AUTO: 0.5 % (ref 0–0.5)
INSULIN SERPL-ACNC: 14.1 UIU/ML (ref 2.6–24.9)
LDLC SERPL CALC-MCNC: 62 MG/DL (ref 0–100)
LYMPHOCYTES # BLD AUTO: 2.68 10*3/MM3 (ref 0.7–3.1)
LYMPHOCYTES NFR BLD AUTO: 40.4 % (ref 19.6–45.3)
MCH RBC QN AUTO: 30.2 PG (ref 26.6–33)
MCHC RBC AUTO-ENTMCNC: 34.2 G/DL (ref 31.5–35.7)
MCV RBC AUTO: 88.3 FL (ref 79–97)
MONOCYTES # BLD AUTO: 0.58 10*3/MM3 (ref 0.1–0.9)
MONOCYTES NFR BLD AUTO: 8.7 % (ref 5–12)
NEUTROPHILS # BLD AUTO: 3.12 10*3/MM3 (ref 1.7–7)
NEUTROPHILS NFR BLD AUTO: 47 % (ref 42.7–76)
NRBC BLD AUTO-RTO: 0 /100 WBC (ref 0–0.2)
PLATELET # BLD AUTO: 375 10*3/MM3 (ref 140–450)
POTASSIUM SERPL-SCNC: 4.8 MMOL/L (ref 3.5–5.2)
PROT SERPL-MCNC: 6.9 G/DL (ref 6–8.5)
RBC # BLD AUTO: 4.44 10*6/MM3 (ref 3.77–5.28)
SODIUM SERPL-SCNC: 137 MMOL/L (ref 136–145)
T3FREE SERPL-MCNC: 2.5 PG/ML (ref 2–4.4)
T4 FREE SERPL-MCNC: 0.93 NG/DL (ref 0.93–1.7)
TRIGL SERPL-MCNC: 267 MG/DL (ref 0–150)
TSH SERPL DL<=0.005 MIU/L-ACNC: 1.57 UIU/ML (ref 0.27–4.2)
VLDLC SERPL CALC-MCNC: 42 MG/DL (ref 5–40)
WBC # BLD AUTO: 6.63 10*3/MM3 (ref 3.4–10.8)

## 2021-09-25 LAB
AMPHETAMINES UR QL SCN: POSITIVE NG/ML
BARBITURATES UR QL SCN: NEGATIVE NG/ML
BENZODIAZ UR QL SCN: NEGATIVE NG/ML
BZE UR QL SCN: NEGATIVE NG/ML
CANNABINOIDS UR QL SCN: NEGATIVE NG/ML
CREAT UR-MCNC: 29.1 MG/DL (ref 20–300)
LABORATORY COMMENT REPORT: ABNORMAL
METHADONE UR QL SCN: NEGATIVE NG/ML
OPIATES UR QL SCN: NEGATIVE NG/ML
OXYCODONE+OXYMORPHONE UR QL SCN: NEGATIVE NG/ML
PCP UR QL: NEGATIVE NG/ML
PH UR: 5.8 [PH] (ref 4.5–8.9)
PROPOXYPH UR QL SCN: NEGATIVE NG/ML

## 2021-10-04 PROBLEM — E88.819 INSULIN RESISTANCE: Status: ACTIVE | Noted: 2021-10-04

## 2021-10-04 PROBLEM — E88.81 INSULIN RESISTANCE: Status: ACTIVE | Noted: 2021-10-04

## 2021-10-04 PROBLEM — E55.9 VITAMIN D INSUFFICIENCY: Status: ACTIVE | Noted: 2021-10-04

## 2021-10-04 RX ORDER — MULTIVIT-MIN/IRON/FOLIC ACID/K 18-600-40
50 CAPSULE ORAL DAILY
Qty: 90 CAPSULE | Refills: 1 | Status: SHIPPED | OUTPATIENT
Start: 2021-10-04 | End: 2021-10-06

## 2021-10-04 RX ORDER — ERGOCALCIFEROL 1.25 MG/1
CAPSULE ORAL
Qty: 4 CAPSULE | Refills: 1 | Status: SHIPPED | OUTPATIENT
Start: 2021-10-04 | End: 2021-10-06

## 2021-10-04 NOTE — PROGRESS NOTES
"[facility] [place] [facility]  [address]  [place] [address]    Name: Bia Badillo  : 1970    Chief Complaint   Patient presents with   • Follow-up   • Obesity        No Known Allergies    Vitals:    10/06/21 0728   BP: 112/66   BP Location: Left arm   Patient Position: Sitting   Cuff Size: Adult   Pulse: 78   Resp: 18   Temp: 97.7 °F (36.5 °C)   TempSrc: Temporal   SpO2: 98%   Weight: 67.8 kg (149 lb 8 oz)   Height: 160 cm (63\")       Patient is currently taking these medications prescribed by this office:  None, initial follow up.     Recent Weight History:   Wt Readings from Last 6 Encounters:   10/06/21 67.8 kg (149 lb 8 oz)   21 67.8 kg (149 lb 8 oz)   20 65.3 kg (144 lb)   10/08/20 63.5 kg (140 lb)   19 59 kg (130 lb)   19 59 kg (130 lb)       Last office visit weight (at [facility] visit) :149.5  Change in weight since last visit: 0  Start Weight:149.5  Total Loss%: 0  Loss of BBW:0    Body composition analysis completed and showed:  %body fat: 41.8  Total fat mass (in lbs): 62.5  Fat free mass (in lbs): 87  Total body water (in lb): 63.5  BMR: 1354     Measurements (in inches)  Waist: 33.2    The patient is exercising with a FITT score of:    Frequency   Intensity Time Strength Training   []   0 None  []   0 None  []   0 None  []   0 None    []   1 (1-2x/week) [x]   1 (light) []   1 (<10 min) [x]   1 (1x/week)   [x]   2 (3-5x/week) []   2 (moderate) []   2 (10-20 min) []   2 (2x/week)   []   3 (daily)   []   3 (moderately hard)  []   4 (very hard) []   3 (20-30 min)  [x]   4 (>30 min) []   3 (3-4x/week)       Office visit for Bia Badillo, a 51 y.o. female, established patient for medical weight management. Patient is unsure with weight loss progress. (unknown-not exercising x 2 weeks)(not feeling well-allergy symptoms)    Hunger control has remained unchanged The patient is exercising. The patient is using a food journal. The patient " is checking weight regularly. Body mass index is 26.48 kg/m². and has not reached treatment goal.     Review of Systems:  Review of Systems   Constitutional: Negative for fatigue.   Eyes: Negative for visual disturbance.   Cardiovascular: Negative for chest pain and palpitations.   Gastrointestinal: Negative for abdominal pain, constipation, diarrhea, nausea and GERD.   Neurological: Negative for memory problem.        Parasthesias negative   Psychiatric/Behavioral: Negative for sleep disturbance and depressed mood. The patient is not nervous/anxious.        Physical Exam:  Physical Exam  Vitals reviewed.   Constitutional:       Appearance: She is well-developed.      Comments: overweight   HENT:      Head:      Comments: masked  Pulmonary:      Effort: Pulmonary effort is normal.   Neurological:      Mental Status: She is alert.   Psychiatric:         Attention and Perception: Attention normal.         Mood and Affect: Mood normal.         Speech: Speech normal.         Behavior: Behavior normal.          ASSESSMENT/PLAN:   Diagnoses and all orders for this visit:    1. Overweight (BMI 25.0-29.9) (Primary)  Assessment & Plan:  Patient's (Body mass index is 26.48 kg/m².) indicates that they are overweight with health conditions that include hypertension . Weight is newly identified. BMI is is above average; BMI management plan is completed.  --Labs were reviewed.  Discussed elevated Carly IR score and deficient vitamin D levels and advised to start vitamin D prescriptions as advised.  --Start Ozempic 0.25 sample provided in office  --Patient did attempt to start using my fitness pal but was struggling just with the logistics of adding the foods.  We did review and encouraged her to make this a priority over the next month.  This is still becoming a habit for her so work on a start off with a minimum goal of 15 out of 30 days this month tracking all foods in my fitness pal.  Patient asked to bring in the journal for a  brief review at next office visit  --Patient has been fighting off a mild illness over the past 2 weeks, after resolution of this she plans to start walking a minimum of 4 days a week for minimum of 1      2. Fatigue, unspecified type  -     Discontinue: cyanocobalamin injection 1,000 mcg    Other orders  -     Discontinue: vitamin D (ERGOCALCIFEROL) 1.25 MG (53962 UT) capsule capsule; Take 1 tablet WEEKLY for 8 weeks. Upon completion, start DAILY vit D supplementation of 2000UT.  Dispense: 4 capsule; Refill: 1  -     Discontinue: Vitamin D, Cholecalciferol, 50 MCG (2000 UT) capsule; Take 1 capsule by mouth Daily for 180 days.  Dispense: 90 capsule; Refill: 1  -     Cancel: Liraglutide (SAXENDA) 18 MG/3ML injection pen; Inject 1.2 mg under the skin into the appropriate area as directed Daily for 30 days. Titrate up an instructed starting at 0.6mg daily  Dispense: 2 pen; Refill: 0  -     Vitamin D, Cholecalciferol, 50 MCG (2000 UT) capsule; Take 1 capsule by mouth Daily for 180 days.  Dispense: 90 capsule; Refill: 1  -     vitamin D (ERGOCALCIFEROL) 1.25 MG (09402 UT) capsule capsule; Take 1 tablet WEEKLY for 8 weeks. Upon completion, start DAILY vit D supplementation of 2000UT.  Dispense: 4 capsule; Refill: 1  -     Semaglutide,0.25 or 0.5MG/DOS, (Ozempic, 0.25 or 0.5 MG/DOSE,) 2 MG/1.5ML solution pen-injector; Inject 0.25 mg under the skin into the appropriate area as directed 1 (One) Time Per Week for 30 days.  Dispense: 1 pen; Refill: 0  -     ondansetron (Zofran) 8 MG tablet; Take 1 tablet by mouth Every 8 (Eight) Hours As Needed for Nausea or Vomiting for up to 30 days.  Dispense: 30 tablet; Refill: 0  -     polyethylene glycol (MiraLax) 17 GM/SCOOP powder; Take 17 g by mouth Daily for 30 days. As needed for constipation  Dispense: 510 g; Refill: 0      I have instructed the patient to continue with pursuit of medical weight loss as a part of this program. P Continue nutritional focus and work towards new  exercise FITT goal of:     Frequency   Intensity Time Strength Training   []   0 None  []   0 None  []   0 None  []   0 None    []   1 (1-2x/week) []   1 (light) []   1 (<10 min) [x]   1 (1x/week)   [x]   2 (3-5x/week) [x]   2 (moderate) []   2 (10-20 min) []   2 (2x/week)   []   3 (daily)   []   3 (moderately hard)  []   4 (very hard) []   3 (20-30 min)  [x]   4 (>30 min) []   3 (3-4x/week)          I spent 50 minutes on this date of service. This time includes time spent by me in the following activities:preparing for the visit, counseling and educating the patient/family/caregiver, ordering medications, tests, or procedures and documenting information in the medical record.    Plan of care reviewed with the patient at the conclusion of today's visit. We discussed the risks, benefits, and limitations of treatments. Continue medications and OTC supplements as discussed. Patient verbalizes understanding of and agreement with management plan.      Return in about 4 weeks (around 11/3/2021).      JESSICA Magaña

## 2021-10-06 ENCOUNTER — OFFICE VISIT (OUTPATIENT)
Dept: BARIATRICS/WEIGHT MGMT | Facility: CLINIC | Age: 51
End: 2021-10-06

## 2021-10-06 VITALS
HEART RATE: 78 BPM | DIASTOLIC BLOOD PRESSURE: 66 MMHG | HEIGHT: 63 IN | SYSTOLIC BLOOD PRESSURE: 112 MMHG | RESPIRATION RATE: 18 BRPM | TEMPERATURE: 97.7 F | BODY MASS INDEX: 26.49 KG/M2 | OXYGEN SATURATION: 98 % | WEIGHT: 149.5 LBS

## 2021-10-06 DIAGNOSIS — E66.3 OVERWEIGHT (BMI 25.0-29.9): Primary | ICD-10-CM

## 2021-10-06 DIAGNOSIS — R53.83 FATIGUE, UNSPECIFIED TYPE: ICD-10-CM

## 2021-10-06 PROCEDURE — 99215 OFFICE O/P EST HI 40 MIN: CPT | Performed by: NURSE PRACTITIONER

## 2021-10-06 RX ORDER — MULTIVIT-MIN/IRON/FOLIC ACID/K 18-600-40
50 CAPSULE ORAL DAILY
Qty: 90 CAPSULE | Refills: 1 | Status: SHIPPED | OUTPATIENT
Start: 2021-10-06 | End: 2021-11-01 | Stop reason: SDUPTHER

## 2021-10-06 RX ORDER — BENZONATATE 200 MG/1
CAPSULE ORAL
COMMUNITY
Start: 2021-09-29 | End: 2021-12-01

## 2021-10-06 RX ORDER — ERGOCALCIFEROL 1.25 MG/1
CAPSULE ORAL
Qty: 4 CAPSULE | Refills: 1 | Status: SHIPPED | OUTPATIENT
Start: 2021-10-06 | End: 2021-12-01

## 2021-10-06 RX ORDER — PREDNISONE 20 MG/1
TABLET ORAL
COMMUNITY
Start: 2021-09-29 | End: 2021-12-01

## 2021-10-06 RX ORDER — ONDANSETRON HYDROCHLORIDE 8 MG/1
8 TABLET, FILM COATED ORAL EVERY 8 HOURS PRN
Qty: 30 TABLET | Refills: 0 | Status: SHIPPED | OUTPATIENT
Start: 2021-10-06 | End: 2021-11-01 | Stop reason: SDUPTHER

## 2021-10-06 RX ORDER — POLYETHYLENE GLYCOL 3350 17 G/17G
17 POWDER, FOR SOLUTION ORAL DAILY
Qty: 510 G | Refills: 0 | Status: SHIPPED | OUTPATIENT
Start: 2021-10-06 | End: 2021-11-05

## 2021-10-06 RX ORDER — CEFDINIR 300 MG/1
300 CAPSULE ORAL 2 TIMES DAILY
COMMUNITY
Start: 2021-09-29 | End: 2021-12-01

## 2021-10-06 RX ORDER — SEMAGLUTIDE 1.34 MG/ML
0.25 INJECTION, SOLUTION SUBCUTANEOUS WEEKLY
Qty: 1 PEN | Refills: 0 | COMMUNITY
Start: 2021-10-06 | End: 2021-11-01 | Stop reason: SDUPTHER

## 2021-10-06 RX ORDER — FLUCONAZOLE 150 MG/1
TABLET ORAL
COMMUNITY
Start: 2021-09-29 | End: 2021-12-01

## 2021-10-06 RX ORDER — CYANOCOBALAMIN 1000 UG/ML
1000 INJECTION, SOLUTION INTRAMUSCULAR; SUBCUTANEOUS ONCE
Status: DISCONTINUED | OUTPATIENT
Start: 2021-10-06 | End: 2021-10-06

## 2021-10-06 NOTE — ASSESSMENT & PLAN NOTE
Patient's (Body mass index is 26.48 kg/m².) indicates that they are overweight with health conditions that include hypertension . Weight is newly identified. BMI is is above average; BMI management plan is completed.  --Labs were reviewed.  Discussed elevated Carly IR score and deficient vitamin D levels and advised to start vitamin D prescriptions as advised.  --Start Ozempic 0.25 sample provided in office  --Patient did attempt to start using my fitness pal but was struggling just with the logistics of adding the foods.  We did review and encouraged her to make this a priority over the next month.  This is still becoming a habit for her so work on a start off with a minimum goal of 15 out of 30 days this month tracking all foods in my fitness pal.  Patient asked to bring in the journal for a brief review at next office visit  --Patient has been fighting off a mild illness over the past 2 weeks, after resolution of this she plans to start walking a minimum of 4 days a week for minimum of 1

## 2021-11-01 ENCOUNTER — OFFICE VISIT (OUTPATIENT)
Dept: BARIATRICS/WEIGHT MGMT | Facility: CLINIC | Age: 51
End: 2021-11-01

## 2021-11-01 VITALS
WEIGHT: 140.5 LBS | SYSTOLIC BLOOD PRESSURE: 138 MMHG | OXYGEN SATURATION: 98 % | HEIGHT: 63 IN | BODY MASS INDEX: 24.89 KG/M2 | DIASTOLIC BLOOD PRESSURE: 84 MMHG | HEART RATE: 78 BPM

## 2021-11-01 DIAGNOSIS — Z76.89 ENCOUNTER FOR WEIGHT MANAGEMENT: Primary | ICD-10-CM

## 2021-11-01 DIAGNOSIS — E88.81 INSULIN RESISTANCE: ICD-10-CM

## 2021-11-01 PROCEDURE — MEDWTINJ PR MEDICAL WT LOSS INJECTION: Performed by: NURSE PRACTITIONER

## 2021-11-01 PROCEDURE — MEDWTESTPT: Performed by: NURSE PRACTITIONER

## 2021-11-01 RX ORDER — SEMAGLUTIDE 1.34 MG/ML
0.25 INJECTION, SOLUTION SUBCUTANEOUS WEEKLY
Qty: 1 PEN | Refills: 0 | COMMUNITY
Start: 2021-11-01 | End: 2021-12-01 | Stop reason: SDUPTHER

## 2021-11-01 RX ORDER — CYANOCOBALAMIN 1000 UG/ML
1000 INJECTION, SOLUTION INTRAMUSCULAR; SUBCUTANEOUS ONCE
Status: COMPLETED | OUTPATIENT
Start: 2021-11-01 | End: 2021-11-01

## 2021-11-01 RX ORDER — ONDANSETRON HYDROCHLORIDE 8 MG/1
8 TABLET, FILM COATED ORAL EVERY 8 HOURS PRN
Qty: 30 TABLET | Refills: 0 | Status: SHIPPED | OUTPATIENT
Start: 2021-11-01 | End: 2021-12-01

## 2021-11-01 RX ORDER — MULTIVIT-MIN/IRON/FOLIC ACID/K 18-600-40
50 CAPSULE ORAL DAILY
Qty: 90 CAPSULE | Refills: 1 | Status: SHIPPED | OUTPATIENT
Start: 2021-11-01 | End: 2022-04-30

## 2021-11-01 RX ADMIN — CYANOCOBALAMIN 1000 MCG: 1000 INJECTION, SOLUTION INTRAMUSCULAR; SUBCUTANEOUS at 14:34

## 2021-11-01 NOTE — ASSESSMENT & PLAN NOTE
--Patient has taken the Ozempic 0.25 and has struggled with nausea as well as finding of regular bowel habit.  She has started MiraLAX and found that a full Daily switched her from constipation to diarrhea.  Advised to restart at a quarter cap every other day and adjust up or down as needed.  --Continue Ozempic 0.25 daily.  Sample provided in office Lot number [id number], expiration 10/2023  --Patient did start tracking in my fitness pal but not consistently.  Did bring in for a brief review and on some days she would get some meals but I did not see any days that she got the full meals.  Refocus on this we will have the same goal of a minimum of 15 full days tract out of the next month.  Asked her to bring in her food journal at next office visit for brief review.   --Exercise has not been a habit for her.  To start to make this a habit we will give a goal of a minimum of 10 to 15 minutes of mindful activity for a minimum of 3 to 4 days.

## 2021-11-01 NOTE — PROGRESS NOTES
"[facility] [place] [facility]  [address]  [place] [address]    Name: Bia Badillo  : 1970    Chief Complaint   Patient presents with   • Follow-up        No Known Allergies    Vitals:    21 1317   BP: 138/84   Pulse: 78   SpO2: 98%   Weight: 63.7 kg (140 lb 8 oz)   Height: 160 cm (63\")       Patient is currently taking these medications prescribed by this office:  Ozempic.     Recent Weight History:   Wt Readings from Last 6 Encounters:   21 63.7 kg (140 lb 8 oz)   10/06/21 67.8 kg (149 lb 8 oz)   21 67.8 kg (149 lb 8 oz)   20 65.3 kg (144 lb)   10/08/20 63.5 kg (140 lb)   19 59 kg (130 lb)       Last office visit weight (at [facility] visit) : 149.8  Change in weight since last visit: 140.8  Start Weight: 149.8  Total Loss%: 6.54  Loss of BBW: 9    Body composition analysis completed and showed:  %body fat: 39.9  Total fat mass (in lbs): 56  Fat free mass (in lbs): 84.5  Total body water (in lb): 62  BMR: 1315     Measurements (in inches)  Waist: 33    The patient is exercising with a FITT score of:    Frequency   Intensity Time Strength Training   []   0 None  []   0 None  []   0 None  [x]   0 None    []   1 (1-2x/week) [x]   1 (light) []   1 (<10 min) []   1 (1x/week)   [x]   2 (3-5x/week) []   2 (moderate) [x]   2 (10-20 min) []   2 (2x/week)   []   3 (daily)   []   3 (moderately hard)  []   4 (very hard) []   3 (20-30 min)  []   4 (>30 min) []   3 (3-4x/week)       Office visit for Bia Badillo, a 51 y.o. female, established patient for medical weight management. Patient is satisfied with weight loss progress.    Hunger control has improved The patient is exercising. The patient is using a food journal, but not consistent. The patient is not checking weight regularly. The patient rates current efforts as 2 out of 10. Body mass index is 24.89 kg/m². and has not reached treatment goal.     Review of Systems:  Review of Systems "   Gastrointestinal: Positive for constipation, diarrhea and nausea.   All other systems reviewed and are negative.      Physical Exam:  Physical Exam  Vitals reviewed.   Constitutional:       Appearance: She is well-developed.   HENT:      Head:      Comments: masked  Pulmonary:      Effort: Pulmonary effort is normal.   Neurological:      Mental Status: She is alert.   Psychiatric:         Attention and Perception: Attention normal.         Mood and Affect: Mood normal.         Speech: Speech normal.         Behavior: Behavior normal.          ASSESSMENT/PLAN:   Diagnoses and all orders for this visit:    1. Encounter for weight management (Primary)  Assessment & Plan:  --Patient has taken the Ozempic 0.25 and has struggled with nausea as well as finding of regular bowel habit.  She has started MiraLAX and found that a full Daily switched her from constipation to diarrhea.  Advised to restart at a quarter cap every other day and adjust up or down as needed.  --Continue Ozempic 0.25 daily.  Sample provided in office Lot number [id number], expiration 10/2023  --Patient did start tracking in my fitness pal but not consistently.  Did bring in for a brief review and on some days she would get some meals but I did not see any days that she got the full meals.  Refocus on this we will have the same goal of a minimum of 15 full days tract out of the next month.  Asked her to bring in her food journal at next office visit for brief review.   --Exercise has not been a habit for her.  To start to make this a habit we will give a goal of a minimum of 10 to 15 minutes of mindful activity for a minimum of 3 to 4 days.    Orders:  -     cyanocobalamin injection 1,000 mcg    2. Insulin resistance    Other orders  -     Semaglutide,0.25 or 0.5MG/DOS, (Ozempic, 0.25 or 0.5 MG/DOSE,) 2 MG/1.5ML solution pen-injector; Inject 0.25 mg under the skin into the appropriate area as directed 1 (One) Time Per Week for 30 days.  Dispense: 1 pen;  Refill: 0  -     ondansetron (Zofran) 8 MG tablet; Take 1 tablet by mouth Every 8 (Eight) Hours As Needed for Nausea or Vomiting for up to 30 days.  Dispense: 30 tablet; Refill: 0  -     Vitamin D, Cholecalciferol, 50 MCG (2000 UT) capsule; Take 1 capsule by mouth Daily for 180 days.  Dispense: 90 capsule; Refill: 1      I have instructed the patient to continue with pursuit of medical weight loss as a part of this program. Continue nutritional focus and work towards new exercise FITT goal of:     Frequency   Intensity Time Strength Training   []   0 None  []   0 None  []   0 None  []   0 None    []   1 (1-2x/week) []   1 (light) []   1 (<10 min) [x]   1 (1x/week)   [x]   2 (3-5x/week) [x]   2 (moderate) []   2 (10-20 min) []   2 (2x/week)   []   3 (daily)   []   3 (moderately hard)  []   4 (very hard) []   3 (20-30 min)  [x]   4 (>30 min) []   3 (3-4x/week)       The current plan for this month includes: weight loss goal 4-6lbs this month, continue to work on lifestyle behavioral changes and continue nutrition focus       I spent 30 minutes on this date of service. This time includes time spent by me in the following activities:preparing for the visit, counseling and educating the patient/family/caregiver, ordering medications, tests, or procedures and documenting information in the medical record.    Plan of care reviewed with the patient at the conclusion of today's visit. We discussed the risks, benefits, and limitations of treatments. Continue medications and OTC supplements as discussed. Patient verbalizes understanding of and agreement with management plan.      Return in about 4 weeks (around 11/29/2021).      JESSICA Magaña

## 2021-12-01 ENCOUNTER — OFFICE VISIT (OUTPATIENT)
Dept: BARIATRICS/WEIGHT MGMT | Facility: CLINIC | Age: 51
End: 2021-12-01

## 2021-12-01 VITALS
DIASTOLIC BLOOD PRESSURE: 72 MMHG | WEIGHT: 136.5 LBS | TEMPERATURE: 97.1 F | RESPIRATION RATE: 18 BRPM | SYSTOLIC BLOOD PRESSURE: 132 MMHG | HEART RATE: 65 BPM | OXYGEN SATURATION: 100 % | HEIGHT: 63 IN | BODY MASS INDEX: 24.19 KG/M2

## 2021-12-01 DIAGNOSIS — Z76.89 ENCOUNTER FOR WEIGHT MANAGEMENT: Primary | ICD-10-CM

## 2021-12-01 DIAGNOSIS — E88.81 INSULIN RESISTANCE: ICD-10-CM

## 2021-12-01 PROCEDURE — MEDWTESTPT: Performed by: NURSE PRACTITIONER

## 2021-12-01 RX ORDER — POLYETHYLENE GLYCOL 3350 17 G/17G
17 POWDER, FOR SOLUTION ORAL DAILY
Qty: 510 G | Refills: 0 | Status: SHIPPED | OUTPATIENT
Start: 2021-12-01 | End: 2021-12-31

## 2021-12-01 RX ORDER — SEMAGLUTIDE 1.34 MG/ML
0.25 INJECTION, SOLUTION SUBCUTANEOUS WEEKLY
Qty: 1 PEN | Refills: 0 | COMMUNITY
Start: 2021-12-01 | End: 2021-12-29 | Stop reason: SDUPTHER

## 2021-12-01 RX ORDER — MECLIZINE HYDROCHLORIDE 25 MG/1
TABLET ORAL
COMMUNITY
Start: 2021-10-07 | End: 2022-05-31

## 2021-12-01 NOTE — PROGRESS NOTES
"[facility] [place] [facility]  [address]  [place] [address]    Name: Bia Badillo  : 1970    Chief Complaint   Patient presents with   • Obesity   • Follow-up        No Known Allergies    Vitals:    21 0901   BP: 132/72   BP Location: Left arm   Patient Position: Sitting   Cuff Size: Adult   Pulse: 65   Resp: 18   Temp: 97.1 °F (36.2 °C)   TempSrc: Temporal   SpO2: 100%   Weight: 61.9 kg (136 lb 8 oz)   Height: 160 cm (63\")       Patient is currently taking these medications prescribed by this office:  ozempic    B: beagel and cream cheese, banana  L: fries, salad   C: cashews  D: differs.       Recent Weight History:   Wt Readings from Last 6 Encounters:   21 61.9 kg (136 lb 8 oz)   21 63.7 kg (140 lb 8 oz)   10/06/21 67.8 kg (149 lb 8 oz)   21 67.8 kg (149 lb 8 oz)   20 65.3 kg (144 lb)   10/08/20 63.5 kg (140 lb)       Last office visit weight (at [facility] visit) : 140.5  Change in weight since last visit: -4.5  Start Weight: 149.5  Total Loss%: -8.70  Loss of BBW: -13.3    Body composition analysis completed and showed:  %body fat: 36.7  Total fat mass (in lbs): 50  Fat free mass (in lbs): 86.5  Total body water (in lb): 63.5  BMR: 1298     Measurements (in inches)  Waist: 30    The patient is exercising with a FITT score of:    Frequency   Intensity Time Strength Training   []   0 None  []   0 None  []   0 None  [x]   0 None    []   1 (1-2x/week) []   1 (light) []   1 (<10 min) []   1 (1x/week)   []   2 (3-5x/week) [x]   2 (moderate) []   2 (10-20 min) []   2 (2x/week)   [x]   3 (daily)   []   3 (moderately hard)  []   4 (very hard) []   3 (20-30 min)  [x]   4 (>30 min) []   3 (3-4x/week)       Office visit for Bia Badillo, a 51 y.o. female, established patient for medical weight management. Patient is satisfied with weight loss progress.    Hunger control has remained unchanged The patient is exercising. The patient is using a " food journal. The patient is not checking weight regularly. The patient rates current efforts as 0 out of 10. Body mass index is 24.18 kg/m². and has not reached treatment goal.     Review of Systems:  Review of Systems   Constitutional: Negative for fatigue.   Eyes: Negative for visual disturbance.   Cardiovascular: Negative for chest pain and palpitations.   Gastrointestinal: Negative for abdominal pain, constipation, diarrhea, nausea and GERD.   Neurological: Negative for dizziness, tremors, light-headedness, headache and memory problem.        Parasthesias negative   Psychiatric/Behavioral: Positive for depressed mood. Negative for sleep disturbance. The patient is not nervous/anxious.        Physical Exam:  Physical Exam  Vitals reviewed.   Constitutional:       Appearance: She is well-developed.   HENT:      Head:      Comments: masked  Pulmonary:      Effort: Pulmonary effort is normal.   Neurological:      Mental Status: She is alert.   Psychiatric:         Attention and Perception: Attention normal.         Mood and Affect: Mood normal.         Speech: Speech normal.         Behavior: Behavior normal.          ASSESSMENT/PLAN:   Diagnoses and all orders for this visit:    1. Encounter for weight management (Primary)  Assessment & Plan:  --Currently taking 0.25mg ozempic and has had some constipation.  She has been managing this with MiraLAX but only taking it every other day and noticing that she is going from constipation to diarrhea.  Advised to start off with a quarter of a cup and be sure to do this daily.  --She is close to reaching her 10% weight loss goal.  Her first goal is 135.8 and she is currently at 136.8.  She is down around 13 pounds from the start.  --Has been going through a stressful time at a anniversary of loss of her daughter is currently going through counseling.  Encouraged to continue this. May discuss starting Wellbutrin with provider.   --Not have food journal at this office visit  encouraged to have it at next office visit for brief review      2. Insulin resistance    Other orders  -     polyethylene glycol (MiraLax) 17 GM/SCOOP powder; Take 17 g by mouth Daily for 30 days. Titrate dose up or down as needed starting out at 1/4 cap daily.  Dispense: 510 g; Refill: 0        I spent 30 minutes on this date of service. This time includes time spent by me in the following activities:preparing for the visit, counseling and educating the patient/family/caregiver, ordering medications, tests, or procedures and documenting information in the medical record.    Plan of care reviewed with the patient at the conclusion of today's visit. We discussed the risks, benefits, and limitations of treatments. Continue medications and OTC supplements as discussed. Patient verbalizes understanding of and agreement with management plan.      Return in about 4 weeks (around 12/29/2021).      Yady Ryan, APRN

## 2021-12-01 NOTE — ASSESSMENT & PLAN NOTE
--Currently taking 0.25mg ozempic and has had some constipation.  She has been managing this with MiraLAX but only taking it every other day and noticing that she is going from constipation to diarrhea.  Advised to start off with a quarter of a cup and be sure to do this daily.  --She is close to reaching her 10% weight loss goal.  Her first goal is 135.8 and she is currently at 136.8.  She is down around 13 pounds from the start.  --Has been going through a stressful time at a anniversary of loss of her daughter is currently going through counseling.  Encouraged to continue this. May discuss starting Wellbutrin with provider.   --Not have food journal at this office visit encouraged to have it at next office visit for brief review

## 2021-12-29 PROBLEM — E66.9 EXCESSIVE FAT: Status: ACTIVE | Noted: 2021-12-29

## 2021-12-29 NOTE — PROGRESS NOTES
"[facility] [place] [facility]  [address]  [place] [address]    --10% weight loss is 135.8  --was having constipation, added miralax  --last visit was stressful due to aniversay of loss of daughter. Did she start wellbutrin?   --Didn't have food journal last visit. Does she have?     Name: Bia Badillo  : 1970    /68 (BP Location: Left arm, Patient Position: Sitting, Cuff Size: Adult)   Pulse 71   Temp 98 °F (36.7 °C) (Temporal)   Resp 18   Ht 160 cm (63\")   Wt 60.3 kg (133 lb)   SpO2 98%   BMI 23.56 kg/m²     Chief Complaint   Patient presents with   • Obesity   • Follow-up        No Known Allergies    Vitals:    21 1101 21 1109   BP: 128/68    BP Location: Left arm    Patient Position: Sitting    Cuff Size: Adult    Pulse: 71    Resp: 18    Temp: 98 °F (36.7 °C)    TempSrc: Temporal    SpO2: 98%    Weight: (!) 603 kg (1330 lb) 60.3 kg (133 lb)   Height: 160 cm (63\")        Recent Weight History:   Wt Readings from Last 6 Encounters:   21 60.3 kg (133 lb)   21 61.9 kg (136 lb 8 oz)   21 63.7 kg (140 lb 8 oz)   10/06/21 67.8 kg (149 lb 8 oz)   21 67.8 kg (149 lb 8 oz)   20 65.3 kg (144 lb)       Last office visit weight (at [facility] visit) : 136.5  Change in weight since last visit: -3.5  Start Weight: 149.5  Total Loss%: -11.04  Loss of BBW: -16.5    Body composition analysis completed and showed:  %body fat:36.9  Total fat mass (in lbs): 49.0  Fat free mass (in lbs):84.0  Total body water (in lb): 61.5  BMR: 1282    Measurements (in inches)  Waist: 30.3    The patient is exercising with a FITT score of:    Frequency   Intensity Time Strength Training   []   0 None  []   0 None  []   0 None  [x]   0 None    []   1 (1-2x/week) []   1 (light) []   1 (<10 min) []   1 (1x/week)   []   2 (3-5x/week) [x]   2 (moderate) []   2 (10-20 min) []   2 (2x/week)   [x]   3 (daily)   []   3 (moderately hard)  []   4 (very hard) " []   3 (20-30 min)  [x]   4 (>30 min) []   3 (3-4x/week)       Office visit for Bia Badillo, a 51 y.o. female, established patient for medical weight management. Patient is unsure with weight loss progress.    Hunger control has improved The patient is exercising. The patient is using a food journal. The patient is not checking weight regularly. The patient rates current efforts as 0 out of 10. Body mass index is 23.56 kg/m². and has not reached treatment goal.     Review of Systems:  Review of Systems   Constitutional: Negative for fatigue.   Eyes: Negative for visual disturbance.   Cardiovascular: Negative for chest pain and palpitations.   Gastrointestinal: Negative for abdominal pain, constipation, diarrhea, nausea and GERD.   Neurological: Negative for dizziness, tremors, light-headedness, headache and memory problem.        Parasthesias negative   Psychiatric/Behavioral: Positive for depressed mood (daughters death anniversary is coming up). Negative for sleep disturbance. The patient is not nervous/anxious.        Physical Exam:  Physical Exam  Vitals reviewed.   Constitutional:       Appearance: She is well-developed.   HENT:      Head:      Comments: masked  Pulmonary:      Effort: Pulmonary effort is normal.   Neurological:      Mental Status: She is alert.   Psychiatric:         Attention and Perception: Attention normal.         Mood and Affect: Mood normal.         Speech: Speech normal.         Behavior: Behavior normal.          ASSESSMENT/PLAN:   Diagnoses and all orders for this visit:    1. Encounter for weight management (Primary)  Assessment & Plan:  --Down 3.5lbs Continue to increase fruits and vegtables. Be cautious of soda intake. She used to drink 6-7 sodas a day so 1x is a great improvement.   --Continue ozempic 0.25 (sample provider)   --Weight loss goal of 0.5 lb to 1 lb week or 2-4 lbs month   --Consider using education educator  --Started new position for property management for  [employer]       2. Insulin resistance  -     Semaglutide,0.25 or 0.5MG/DOS, (Ozempic, 0.25 or 0.5 MG/DOSE,) 2 MG/1.5ML solution pen-injector; Inject 0.25 mg under the skin into the appropriate area as directed 1 (One) Time Per Week for 30 days.  Dispense: 1 pen; Refill: 0    3. Excessive fat       I spent 30 minutes on this date of service. This time includes time spent by me in the following activities:preparing for the visit, counseling and educating the patient/family/caregiver, ordering medications, tests, or procedures and documenting information in the medical record.    Plan of care reviewed with the patient at the conclusion of today's visit. We discussed the risks, benefits, and limitations of treatments. Continue medications and OTC supplements as discussed. Patient verbalizes understanding of and agreement with management plan.      Return in about 4 weeks (around 1/27/2022).      Yady Ryan APRN

## 2021-12-30 ENCOUNTER — OFFICE VISIT (OUTPATIENT)
Dept: BARIATRICS/WEIGHT MGMT | Facility: CLINIC | Age: 51
End: 2021-12-30

## 2021-12-30 VITALS
WEIGHT: 133 LBS | HEIGHT: 63 IN | SYSTOLIC BLOOD PRESSURE: 128 MMHG | TEMPERATURE: 98 F | DIASTOLIC BLOOD PRESSURE: 68 MMHG | BODY MASS INDEX: 23.57 KG/M2 | RESPIRATION RATE: 18 BRPM | OXYGEN SATURATION: 98 % | HEART RATE: 71 BPM

## 2021-12-30 DIAGNOSIS — E88.81 INSULIN RESISTANCE: ICD-10-CM

## 2021-12-30 DIAGNOSIS — E66.9 EXCESSIVE FAT: ICD-10-CM

## 2021-12-30 DIAGNOSIS — Z76.89 ENCOUNTER FOR WEIGHT MANAGEMENT: Primary | ICD-10-CM

## 2021-12-30 PROCEDURE — MEDWTESTPT: Performed by: NURSE PRACTITIONER

## 2021-12-30 RX ORDER — SEMAGLUTIDE 1.34 MG/ML
0.25 INJECTION, SOLUTION SUBCUTANEOUS WEEKLY
Qty: 1 PEN | Refills: 0 | COMMUNITY
Start: 2021-12-30 | End: 2022-01-29

## 2021-12-30 RX ORDER — CYCLOSPORINE 0.5 MG/ML
EMULSION OPHTHALMIC
COMMUNITY
Start: 2021-12-29 | End: 2022-05-31

## 2021-12-30 NOTE — ASSESSMENT & PLAN NOTE
--Down 3.5lbs Continue to increase fruits and vegtables. Be cautious of soda intake. She used to drink 6-7 sodas a day so 1x is a great improvement.   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

## 2022-01-27 ENCOUNTER — LAB (OUTPATIENT)
Dept: LAB | Facility: HOSPITAL | Age: 52
End: 2022-01-27

## 2022-01-27 ENCOUNTER — OFFICE VISIT (OUTPATIENT)
Dept: NEUROLOGY | Facility: CLINIC | Age: 52
End: 2022-01-27

## 2022-01-27 VITALS
TEMPERATURE: 97.3 F | WEIGHT: 129 LBS | HEIGHT: 63 IN | OXYGEN SATURATION: 99 % | DIASTOLIC BLOOD PRESSURE: 72 MMHG | BODY MASS INDEX: 22.86 KG/M2 | HEART RATE: 73 BPM | SYSTOLIC BLOOD PRESSURE: 116 MMHG

## 2022-01-27 DIAGNOSIS — F41.9 ANXIETY: ICD-10-CM

## 2022-01-27 DIAGNOSIS — N28.9 RENAL INSUFFICIENCY: ICD-10-CM

## 2022-01-27 DIAGNOSIS — R25.1 TREMOR: ICD-10-CM

## 2022-01-27 DIAGNOSIS — F33.1 MODERATE RECURRENT MAJOR DEPRESSION: ICD-10-CM

## 2022-01-27 DIAGNOSIS — R25.1 TREMOR: Primary | ICD-10-CM

## 2022-01-27 PROBLEM — E66.9 EXCESSIVE FAT: Status: RESOLVED | Noted: 2021-12-29 | Resolved: 2022-01-27

## 2022-01-27 PROCEDURE — 99214 OFFICE O/P EST MOD 30 MIN: CPT | Performed by: NURSE PRACTITIONER

## 2022-01-27 RX ORDER — LURASIDONE HYDROCHLORIDE 40 MG/1
TABLET, FILM COATED ORAL
COMMUNITY
Start: 2022-01-20

## 2022-01-27 RX ORDER — PROPRANOLOL HYDROCHLORIDE 40 MG/1
40 TABLET ORAL 2 TIMES DAILY
Qty: 180 TABLET | Refills: 3 | Status: SHIPPED | OUTPATIENT
Start: 2022-01-27 | End: 2022-08-15 | Stop reason: SINTOL

## 2022-01-27 RX ORDER — PROPRANOLOL HYDROCHLORIDE 40 MG/1
TABLET ORAL
COMMUNITY
Start: 2022-01-20 | End: 2022-01-27 | Stop reason: SDUPTHER

## 2022-01-27 RX ORDER — TIZANIDINE 4 MG/1
TABLET ORAL
COMMUNITY
Start: 2022-01-04

## 2022-01-27 RX ORDER — LURASIDONE HYDROCHLORIDE 80 MG/1
80 TABLET, FILM COATED ORAL DAILY
COMMUNITY
Start: 2022-01-07 | End: 2022-05-31

## 2022-01-27 NOTE — PROGRESS NOTES
Neuro Office Visit      Encounter Date: 2022   Patient Name: Bia Badillo  : 1970   MRN: [id number]   PCP: Dr Nova.  Chief Complaint:    Chief Complaint   Patient presents with   • Tremors       History of Present Illness: Bia Badillo is a 51 y.o. female who is here today in Neurology for tremors.    Tremor  Gradual onset 18 months. Whole body tremors. Brought on by anxiety. Treated with propranolol with good results.  Worse if she doesn't take. Would like to stop due to potential for weight gain.    Denies unilateral tremor. No voice tremor. Others cannot see the tremor. She can't stop it. She can't tell in which direction she has tremor.  She has had 1 visual hallucination. Saw a cat in her house, but she has a cat and this looked like her cat. She refuses to describe other hallucinations.  Denies trouble rolling over in bed. She has auditory hallucinations with bells and music. No falls, but feels she may fall soon. No balance problems or near falls. No vertigo.     She does not have a healthy diet. Meds cause nausea. She is skipping meals. Taking Ozempic for weight loss. Has lost 30 pounds. Weight loss provider wants her to stop propranolol.     No caffeine. No supplements..  Takes Adderall for ADHD. Topirimate for HA-controlled. Latuda for insomnia.    FH MGF w tremor. No known members with PD.    Labs with renal insuff. TSH normal.    Subjective      Past Medical History:   Past Medical History:   Diagnosis Date   • ADHD    • Anxiety    • Depression    • Dyspareunia, female 2019   • Hypertension    • Insulin resistance 10/4/2021    TRAMAINE- IR score 2.96-testing glucose 85, fasting insulin 14.1 (labs 2021)   • Menopausal symptoms 2018   • Vitamin D insufficiency 10/4/2021    Labs 2021 Rx 10/2021: 50,000 with 2000IU daily after.       Past Surgical History:   Past Surgical History:   Procedure Laterality Date   • BREAST IMPLANT SURGERY     • LEEP      Mild dysplasia        Family History:   Family History   Problem Relation Age of Onset   • Breast cancer Mother    • No Known Problems Father    • Colon polyps Sister    • Pancreatitis Brother    • Alcohol abuse Brother    • Hypertension Brother    • Colon polyps Sister    • Drug abuse Daughter    • Tremor Maternal Grandfather        Social History:   Social History     Socioeconomic History   • Marital status:    Tobacco Use   • Smoking status: Never Smoker   • Smokeless tobacco: Never Used   • Tobacco comment: patient states if she is stressed she might have one   Vaping Use   • Vaping Use: Never used   Substance and Sexual Activity   • Alcohol use: Never   • Drug use: No   • Sexual activity: Not Currently     Partners: Male     Birth control/protection: Post-menopausal       Medications:     Current Outpatient Medications:   •  amphetamine-dextroamphetamine (ADDERALL) 10 MG tablet, TK 1 T PO TID, Disp: , Rfl:   •  amphetamine-dextroamphetamine (ADDERALL) 30 MG tablet, Take 1 tablet by mouth 2 (Two) Times a Day., Disp: , Rfl: 0  •  chlorzoxazone (PARAFON FORTE) 500 MG tablet, TK 1 T PO UP TO TID PRF MSP OR PAIN, Disp: , Rfl:   •  estradiol (ESTRACE) 1 MG tablet, Take 1 tablet by mouth Daily., Disp: 30 tablet, Rfl: 11  •  Latuda 80 MG tablet tablet, Take 80 mg by mouth Daily., Disp: , Rfl:   •  meclizine (ANTIVERT) 25 MG tablet, TAKE 1 TABLET BY MOUTH THREE TIMES DAILY AS NEEDED FOR DIZZINESS OR NAUSEA, Disp: , Rfl:   •  meloxicam (MOBIC) 7.5 MG tablet, Take 7.5 mg by mouth Daily., Disp: , Rfl:   •  propranolol (INDERAL) 40 MG tablet, Take 1 tablet by mouth 2 (Two) Times a Day., Disp: 180 tablet, Rfl: 3  •  Restasis 0.05 % ophthalmic emulsion, , Disp: , Rfl:   •  Semaglutide,0.25 or 0.5MG/DOS, (Ozempic, 0.25 or 0.5 MG/DOSE,) 2 MG/1.5ML solution pen-injector, Inject 0.25 mg under the skin into the appropriate area as directed 1 (One) Time Per Week for 30 days., Disp: 1 pen, Rfl: 0  •  tiZANidine (ZANAFLEX) 4 MG tablet, TAKE  1 TABLET BY MOUTH EVERY DAY AS NEEDED FOR MUSCLE/BACK PAIN, Disp: , Rfl:   •  topiramate (TOPAMAX) 200 MG tablet, Take  by mouth., Disp: , Rfl:   •  Vitamin D, Cholecalciferol, 50 MCG (2000 UT) capsule, Take 1 capsule by mouth Daily for 180 days., Disp: 90 capsule, Rfl: 1  •  LATUDA 120 MG tablet, TK 1 T PO QD AT 9 PM, Disp: , Rfl: 2  •  Latuda 40 MG tablet tablet, , Disp: , Rfl:     Allergies:   No Known Allergies    PHQ-9 Total Score:     STEADI Fall Risk Assessment has not been completed.    Objective     Physical Exam:   Physical Exam  Eyes:      Pupils: Pupils are equal, round, and reactive to light.   Neurological:      Mental Status: She is oriented to person, place, and time.      Coordination: Finger-Nose-Finger Test, Heel to Shin Test and Romberg Test normal.      Gait: Gait is intact.      Deep Tendon Reflexes:      Reflex Scores:       Tricep reflexes are 2+ on the right side and 2+ on the left side.       Bicep reflexes are 2+ on the right side and 2+ on the left side.       Brachioradialis reflexes are 2+ on the right side and 2+ on the left side.       Patellar reflexes are 2+ on the right side and 2+ on the left side.       Achilles reflexes are 2+ on the right side and 2+ on the left side.  Psychiatric:         Speech: Speech normal.         Neurologic Exam     Mental Status   Oriented to person, place, and time.   Follows 3 step commands.   Attention: normal. Concentration: normal.   Speech: speech is normal   Level of consciousness: alert  Knowledge: consistent with education.   Normal comprehension.     Cranial Nerves     CN III, IV, VI   Pupils are equal, round, and reactive to light.  Right pupil: Accommodation: intact.   Left pupil: Accommodation: intact.   CN III: no CN III palsy  CN VI: no CN VI palsy  Nystagmus: none   Diplopia: none  Upgaze: normal  Downgaze: normal  Conjugate gaze: present    CN VII   Facial expression full, symmetric.     CN VIII   Hearing: intact    CN XII   CN XII  "normal.     Motor Exam   Muscle bulk: normal  Overall muscle tone: normal    Strength   Right biceps: 5/5  Left biceps: 5/5  Right triceps: 5/5  Left triceps: 5/5  Right interossei: 5/5  Left interossei: 5/5  Right quadriceps: 5/5  Left quadriceps: 5/5  Right anterior tibial: 5/5  Left anterior tibial: 5/5  Right posterior tibial: 5/5  Left posterior tibial: 5/5    Sensory Exam   Light touch normal.     Gait, Coordination, and Reflexes     Gait  Gait: normal    Coordination   Romberg: negative  Finger to nose coordination: normal  Heel to shin coordination: normal    Tremor   Resting tremor: absent  Action tremor: absent    Reflexes   Right brachioradialis: 2+  Left brachioradialis: 2+  Right biceps: 2+  Left biceps: 2+  Right triceps: 2+  Left triceps: 2+  Right patellar: 2+  Left patellar: 2+  Right achilles: 2+  Left achilles: 2+  Right : 2+  Left : 2+       Vital Signs:   Vitals:    01/27/22 0850   BP: 116/72   Pulse: 73   Temp: 97.3 °F (36.3 °C)   SpO2: 99%   Weight: 58.5 kg (129 lb)   Height: 160 cm (63\")     Body mass index is 22.85 kg/m².         Assessment / Plan      Assessment/Plan:   Diagnoses and all orders for this visit:    1. Tremor (Primary)  -     CK; Future  -     Comprehensive Metabolic Panel; Future  -     CBC Auto Differential; Future  -     Vitamin B12; Future  -     TSH; Future  -     Magnesium; Future  -     propranolol (INDERAL) 40 MG tablet; Take 1 tablet by mouth 2 (Two) Times a Day.  Dispense: 180 tablet; Refill: 3    2. Anxiety  -     propranolol (INDERAL) 40 MG tablet; Take 1 tablet by mouth 2 (Two) Times a Day.  Dispense: 180 tablet; Refill: 3    3. Moderate recurrent major depression (HCC)    4. Renal insufficiency       We discussed the benefits of beta blocker for her including controlling tremor, anxiety, and headache. There is a potential for weight gain on a beta blocker. She is also on 3 meds which can cause tremor but the benefit outweighs the risk of stopping this " medication.  At this point she has decided to stay on propranolol 40mg bid.  I also gave her resources for local  family support groups.  Patient Education:       Reviewed medications, potential side effects and signs and symptoms to report. Discussed risk versus benefits of treatment plan with patient and/or family-including medications, labs and radiology that may be ordered. Addressed questions and concerns during visit. Patient and/or family verbalized understanding and agree with plan. Instructed to call the office with any questions and report to ER with any life-threatening symptoms.     Follow Up:   Return in about 6 months (around 7/27/2022) for Recheck.    During this visit the following were done:  Labs Reviewed [x]    Labs Ordered [x]    Radiology Reports Reviewed []    Radiology Ordered []    PCP Records Reviewed [x]    Referring Provider Records Reviewed []    ER Records Reviewed []    Hospital Records Reviewed []    History Obtained From Family []    Radiology Images Reviewed []    Other Reviewed [x]    Records Requested []      Cleo Carmona, DNP, APRN

## 2022-01-28 ENCOUNTER — TELEPHONE (OUTPATIENT)
Dept: NEUROLOGY | Facility: CLINIC | Age: 52
End: 2022-01-28

## 2022-01-28 LAB
ALBUMIN SERPL-MCNC: 4.3 G/DL (ref 3.5–5.2)
ALBUMIN/GLOB SERPL: 1.9 G/DL
ALP SERPL-CCNC: 40 U/L (ref 39–117)
ALT SERPL-CCNC: 25 U/L (ref 1–33)
AST SERPL-CCNC: 13 U/L (ref 1–32)
BASOPHILS # BLD AUTO: 0.04 10*3/MM3 (ref 0–0.2)
BASOPHILS NFR BLD AUTO: 0.5 % (ref 0–1.5)
BILIRUB SERPL-MCNC: 0.3 MG/DL (ref 0–1.2)
BUN SERPL-MCNC: 7 MG/DL (ref 6–20)
BUN/CREAT SERPL: 6.5 (ref 7–25)
CALCIUM SERPL-MCNC: 9.7 MG/DL (ref 8.6–10.5)
CHLORIDE SERPL-SCNC: 108 MMOL/L (ref 98–107)
CK SERPL-CCNC: 59 U/L (ref 20–180)
CO2 SERPL-SCNC: 22.3 MMOL/L (ref 22–29)
CREAT SERPL-MCNC: 1.08 MG/DL (ref 0.57–1)
EOSINOPHIL # BLD AUTO: 0.15 10*3/MM3 (ref 0–0.4)
EOSINOPHIL NFR BLD AUTO: 2 % (ref 0.3–6.2)
ERYTHROCYTE [DISTWIDTH] IN BLOOD BY AUTOMATED COUNT: 12 % (ref 12.3–15.4)
GLOBULIN SER CALC-MCNC: 2.3 GM/DL
GLUCOSE SERPL-MCNC: 90 MG/DL (ref 65–99)
HCT VFR BLD AUTO: 42 % (ref 34–46.6)
HGB BLD-MCNC: 13.9 G/DL (ref 12–15.9)
IMM GRANULOCYTES # BLD AUTO: 0.03 10*3/MM3 (ref 0–0.05)
IMM GRANULOCYTES NFR BLD AUTO: 0.4 % (ref 0–0.5)
LYMPHOCYTES # BLD AUTO: 3.43 10*3/MM3 (ref 0.7–3.1)
LYMPHOCYTES NFR BLD AUTO: 45.3 % (ref 19.6–45.3)
MAGNESIUM SERPL-MCNC: 2.4 MG/DL (ref 1.6–2.6)
MCH RBC QN AUTO: 30.3 PG (ref 26.6–33)
MCHC RBC AUTO-ENTMCNC: 33.1 G/DL (ref 31.5–35.7)
MCV RBC AUTO: 91.7 FL (ref 79–97)
MONOCYTES # BLD AUTO: 0.9 10*3/MM3 (ref 0.1–0.9)
MONOCYTES NFR BLD AUTO: 11.9 % (ref 5–12)
NEUTROPHILS # BLD AUTO: 3.03 10*3/MM3 (ref 1.7–7)
NEUTROPHILS NFR BLD AUTO: 39.9 % (ref 42.7–76)
NRBC BLD AUTO-RTO: 0.1 /100 WBC (ref 0–0.2)
PLATELET # BLD AUTO: 395 10*3/MM3 (ref 140–450)
POTASSIUM SERPL-SCNC: 4 MMOL/L (ref 3.5–5.2)
PROT SERPL-MCNC: 6.6 G/DL (ref 6–8.5)
RBC # BLD AUTO: 4.58 10*6/MM3 (ref 3.77–5.28)
SODIUM SERPL-SCNC: 141 MMOL/L (ref 136–145)
TSH SERPL-ACNC: 1.31 UIU/ML (ref 0.27–4.2)
VIT B12 SERPL-MCNC: 1974 PG/ML (ref 211–946)
WBC # BLD AUTO: 7.58 10*3/MM3 (ref 3.4–10.8)

## 2022-01-28 NOTE — TELEPHONE ENCOUNTER
----- Message from Cleo Carmona, BOYD, APRN sent at 1/28/2022  8:58 AM EST -----  Pls notify pt of nml magnesium, thyroid, blood counts, renal function is improving. Muscle breakdown level is normal. Vitamin b 12 is elevated. If taking an oral supplement can decrease to every other day.

## 2022-01-28 NOTE — TELEPHONE ENCOUNTER
Left detailed vm for Bia relaying results/recomendation per Sky. Office # given if any questions. Thanks!

## 2022-02-02 ENCOUNTER — OFFICE VISIT (OUTPATIENT)
Dept: BARIATRICS/WEIGHT MGMT | Facility: CLINIC | Age: 52
End: 2022-02-02

## 2022-02-02 VITALS
DIASTOLIC BLOOD PRESSURE: 80 MMHG | OXYGEN SATURATION: 98 % | WEIGHT: 124.5 LBS | HEART RATE: 78 BPM | TEMPERATURE: 97.4 F | HEIGHT: 63 IN | BODY MASS INDEX: 22.06 KG/M2 | SYSTOLIC BLOOD PRESSURE: 126 MMHG | RESPIRATION RATE: 12 BRPM

## 2022-02-02 DIAGNOSIS — Z76.89 ENCOUNTER FOR WEIGHT MANAGEMENT: Primary | ICD-10-CM

## 2022-02-02 DIAGNOSIS — E88.81 INSULIN RESISTANCE: ICD-10-CM

## 2022-02-02 PROCEDURE — MEDWTESTPT: Performed by: NURSE PRACTITIONER

## 2022-02-02 NOTE — ASSESSMENT & PLAN NOTE
--Patient has reached her final weight loss goal of 125.  She currently weighs 124.5.  --She currently has been on 0.25 Ozempic.  We are discontinuing that today.  I would like to see her in 1 month to see how she is doing without the medication.  If she is doing well we will have plans to space her next appointment out anywhere from 3 to 6 months.  Or potentially even having her call us as needed.  --She has made great strides in decreasing her full sugar sodas with a previously being around 6-8 sodas per day and she is now down to 1 or even less than 1 daily.  --Control action panic Plan was initiated.  Control weight is 125, action weight is 128-129, and panic weight is 131

## 2022-02-04 ENCOUNTER — TELEPHONE (OUTPATIENT)
Dept: OBSTETRICS AND GYNECOLOGY | Facility: CLINIC | Age: 52
End: 2022-02-04

## 2022-02-04 RX ORDER — PROGESTERONE 200 MG/1
200 CAPSULE ORAL DAILY
Qty: 30 CAPSULE | Refills: 0 | Status: SHIPPED | OUTPATIENT
Start: 2022-02-04 | End: 2022-02-10 | Stop reason: SDUPTHER

## 2022-02-04 RX ORDER — ESTRADIOL 1 MG/1
1 TABLET ORAL DAILY
Qty: 30 TABLET | Refills: 0 | Status: SHIPPED | OUTPATIENT
Start: 2022-02-04 | End: 2022-02-10 | Stop reason: DRUGHIGH

## 2022-02-10 ENCOUNTER — OFFICE VISIT (OUTPATIENT)
Dept: OBSTETRICS AND GYNECOLOGY | Facility: CLINIC | Age: 52
End: 2022-02-10

## 2022-02-10 VITALS
SYSTOLIC BLOOD PRESSURE: 112 MMHG | BODY MASS INDEX: 22.5 KG/M2 | RESPIRATION RATE: 16 BRPM | WEIGHT: 127 LBS | DIASTOLIC BLOOD PRESSURE: 70 MMHG

## 2022-02-10 DIAGNOSIS — Z01.419 ENCOUNTER FOR GYNECOLOGICAL EXAMINATION WITHOUT ABNORMAL FINDING: Primary | ICD-10-CM

## 2022-02-10 DIAGNOSIS — N95.1 MENOPAUSAL SYMPTOMS: ICD-10-CM

## 2022-02-10 DIAGNOSIS — Z80.3 FAMILY HISTORY OF BREAST CANCER: ICD-10-CM

## 2022-02-10 PROCEDURE — 99396 PREV VISIT EST AGE 40-64: CPT | Performed by: NURSE PRACTITIONER

## 2022-02-10 RX ORDER — ESTRADIOL 0.5 MG/1
0.5 TABLET ORAL DAILY
Qty: 30 TABLET | Refills: 11 | Status: SHIPPED | OUTPATIENT
Start: 2022-02-10 | End: 2023-02-27

## 2022-02-10 RX ORDER — PROGESTERONE 200 MG/1
200 CAPSULE ORAL DAILY
Qty: 30 CAPSULE | Refills: 11 | Status: SHIPPED | OUTPATIENT
Start: 2022-02-10 | End: 2023-03-02

## 2022-02-10 NOTE — PROGRESS NOTES
Annual Visit     Patient Name: Bia Badillo  : 1970   MRN: [id number]   Care Team: Patient Care Team:  Blayne Nova APRN as PCP - General (Family Medicine)    Chief Complaint:    Chief Complaint   Patient presents with   • Annual Exam       HPI: Bia Badillo is a 51 y.o. year old  presenting to be seen for her gynecologic exam.   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      Objective     Physical Exam    Neuro: alert and oriented to person, place and time   General:  alert; cooperative; well developed; well nourished   Skin:  No suspicious lesions seen   Thyroid: normal to inspection and palpation   Lungs:  breathing is unlabored  clear to auscultation bilaterally   Heart:  regular rate and rhythm, S1, S2 normal, no murmur, click, rub or gallop  normal apical impulse   Breasts:  Examined in supine position  Symmetric without masses or skin dimpling  Nipples normal without inversion, lesions or discharge  There are no palpable axillary nodes  Bilateral implants are noted without obvious palpable abnormalities   Abdomen: soft, non-tender; no masses  no umbilical or inguinal hernias are present  no hepato-splenomegaly   Pelvis: Clinical staff was present for exam  External genitalia:  normal appearance of the external genitalia including  Bartholin's and [place]'s glands.  :  urethral meatus normal;  Vaginal:  normal pink mucosa without prolapse or lesions.  Cervix:  normal appearance.  Uterus:  normal size, shape and consistency.  Adnexa:  non palpable bilaterally.  Rectal:  digital rectal exam not performed; anus visually normal appearing.         Assessment / Plan      Assessment  Problems Addressed This Visit    ICD-10-CM ICD-9-CM   1. Encounter for gynecological examination without abnormal finding  Z01.419 V72.31   2. Menopausal symptoms  N95.1 627.2   3. Family history of breast cancer  Z80.3 V16.3       Plan    Pap smear not indicated today   Discussed monthly SBEs and importance of annual imaging   She will have reports sent here     Discussed risks of cont HRT including blood clot, stroke, and MI - pt v/u   She would like to try going without HRT for a little longer but she is very concerned about hot flashes  She is agreeable to reducing dose of estradiol to 0.5mg qd   Will cont with prometrium 200mg qd   She will let me know if she does not continue HRT -  Pt v/u     AV 1 yr             Follow Up  Return in about 1 year (around 2/10/2023) for Annual physical.  Patient was given instructions and counseling regarding her condition or for health maintenance advice. Please see specific information pulled into the AVS if appropriate.     Sonia Lombardo, JESSICA  February 10, 2022  08:43 EST

## 2022-05-31 ENCOUNTER — LAB (OUTPATIENT)
Dept: LAB | Facility: HOSPITAL | Age: 52
End: 2022-05-31

## 2022-05-31 ENCOUNTER — OFFICE VISIT (OUTPATIENT)
Dept: OBSTETRICS AND GYNECOLOGY | Facility: CLINIC | Age: 52
End: 2022-05-31

## 2022-05-31 VITALS
DIASTOLIC BLOOD PRESSURE: 80 MMHG | BODY MASS INDEX: 22.14 KG/M2 | RESPIRATION RATE: 16 BRPM | WEIGHT: 125 LBS | SYSTOLIC BLOOD PRESSURE: 122 MMHG

## 2022-05-31 DIAGNOSIS — N95.1 MENOPAUSAL SYMPTOMS: ICD-10-CM

## 2022-05-31 DIAGNOSIS — N30.01 ACUTE CYSTITIS WITH HEMATURIA: Primary | ICD-10-CM

## 2022-05-31 DIAGNOSIS — N30.01 ACUTE CYSTITIS WITH HEMATURIA: ICD-10-CM

## 2022-05-31 PROCEDURE — 99213 OFFICE O/P EST LOW 20 MIN: CPT | Performed by: NURSE PRACTITIONER

## 2022-05-31 PROCEDURE — 96372 THER/PROPH/DIAG INJ SC/IM: CPT | Performed by: NURSE PRACTITIONER

## 2022-05-31 RX ORDER — PHENAZOPYRIDINE HYDROCHLORIDE 200 MG/1
200 TABLET, FILM COATED ORAL 3 TIMES DAILY PRN
Qty: 9 TABLET | Refills: 0 | Status: SHIPPED | OUTPATIENT
Start: 2022-05-31 | End: 2022-06-07 | Stop reason: SDUPTHER

## 2022-05-31 RX ORDER — NITROFURANTOIN 25; 75 MG/1; MG/1
100 CAPSULE ORAL 2 TIMES DAILY
Qty: 14 CAPSULE | Refills: 0 | Status: SHIPPED | OUTPATIENT
Start: 2022-05-31 | End: 2022-06-07

## 2022-05-31 RX ORDER — CEFTRIAXONE 500 MG/1
500 INJECTION, POWDER, FOR SOLUTION INTRAMUSCULAR; INTRAVENOUS EVERY 24 HOURS
Status: COMPLETED | OUTPATIENT
Start: 2022-05-31 | End: 2022-05-31

## 2022-05-31 RX ORDER — BUPROPION HYDROCHLORIDE 150 MG/1
TABLET ORAL
COMMUNITY
Start: 2022-05-20

## 2022-05-31 RX ORDER — MELOXICAM 15 MG/1
15 TABLET ORAL DAILY
COMMUNITY
Start: 2022-04-19

## 2022-05-31 RX ORDER — ACETAMINOPHEN 160 MG
TABLET,DISINTEGRATING ORAL
COMMUNITY
Start: 2022-05-16

## 2022-05-31 RX ADMIN — CEFTRIAXONE 500 MG: 500 INJECTION, POWDER, FOR SOLUTION INTRAMUSCULAR; INTRAVENOUS at 10:56

## 2022-05-31 NOTE — PROGRESS NOTES
Problem Visit     Patient Name: Bia Badillo  : 1970   MRN: [id number]   Care Team: Patient Care Team:  Blayne Nova APRN as PCP - General (Family Medicine)    Chief Complaint:    Chief Complaint   Patient presents with   • Urinary Frequency          • Blood in Urine   • Abdominal Pain     Passed a blood clot and cramping       HPI: Bia Badillo is a 51 y.o. year old  presenting to be seen with c/o pelvic pain and urinary sx.  States she began having pelvic pain about 3-4 wks ago   Then pain increased and felt like period like cramping this last wk   Also with dysuria and frequency for the last wk now   Has taken OTC meds like AZO without relief   Flank pain bilaterally   No F/C/N/V   She has noticed blood when wiping after urination 3 days ago   Noticed blood in the toilet today just now when leaving urine sample   No vaginal c/o   Denies difficulty passing urine - feels like she empties her bladder completely     Doing well with HRT   Estradiol decreased to 0.5mg qd at AV in 2022 - doing well   Continues with Prometrium 200mg qd     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      Objective     Physical Exam      Neuro: alert and oriented to person, place and time   General:  alert; cooperative; well developed; well nourished   Skin:  Not performed.   Thyroid: not examined   Lungs:  breathing is unlabored   Heart:  Not performed.   Breasts:  Not performed.   Abdomen: Not performed.   Pelvis: Clinical staff was present for exam  External genitalia:  normal appearance of the external genitalia including Bartholin's and [place]'s glands.  :  urethral meatus normal;  Vaginal:  normal pink mucosa without prolapse or lesions.  Cervix:  normal appearance.  Uterus:  normal size,  shape and consistency.  Adnexa:  normal bimanual exam of the adnexa. tenderness of the right adnexa to  superficial and deep palpation;  Rectal:  digital rectal exam not performed; anus visually normal appearing.     RLQ tenderness on exam - states her pain is generalized and bilateral CVA noted     Assessment / Plan      Assessment  Problems Addressed This Visit    ICD-10-CM ICD-9-CM   1. Acute cystitis with hematuria  N30.01 595.0   2. Menopausal symptoms  N95.1 627.2       Plan    Discussed UTI s/sx and pylonephritis   Stressed importance of eval at onset of sx   If severe pain, difficulty with passing urine, or N/V/F/C develop, must report to ER - pt v/u   CBC today   Rocephin 500mg IM given today   Script for macrobid and pyridium to pharmacy   Urine cx pending   Will call with cx results and to f/u   Work excuse given for today and tomorrow - rest and push fluids   Call with questions/concerns     Discussed on exam, no bldg noted from the cervix or vaginally   Since only with urination and wiping, suspect d/t UTI   But if bldg continues once UTI ruled out/treated, must have pelvic u/s - pt v/u     Doing well with Estradiol 0.5mg qd and prometrium 200mg qd   AV due Feb 2023            Follow Up  Return if symptoms worsen or fail to improve.  Patient was given instructions and counseling regarding her condition or for health maintenance advice. Please see specific information pulled into the AVS if appropriate.     Sonia Lombardo, JESSICA  May 31, 2022  10:31 EDT

## 2022-06-01 LAB
ERYTHROCYTE [DISTWIDTH] IN BLOOD BY AUTOMATED COUNT: 12.1 % (ref 12.3–15.4)
HCT VFR BLD AUTO: 38.8 % (ref 34–46.6)
HGB BLD-MCNC: 13 G/DL (ref 12–15.9)
MCH RBC QN AUTO: 30.4 PG (ref 26.6–33)
MCHC RBC AUTO-ENTMCNC: 33.5 G/DL (ref 31.5–35.7)
MCV RBC AUTO: 90.9 FL (ref 79–97)
PLATELET # BLD AUTO: 315 10*3/MM3 (ref 140–450)
RBC # BLD AUTO: 4.27 10*6/MM3 (ref 3.77–5.28)
WBC # BLD AUTO: 11.65 10*3/MM3 (ref 3.4–10.8)

## 2022-06-03 ENCOUNTER — TELEPHONE (OUTPATIENT)
Dept: OBSTETRICS AND GYNECOLOGY | Facility: CLINIC | Age: 52
End: 2022-06-03

## 2022-06-03 NOTE — TELEPHONE ENCOUNTER
PT MISSED WORK 6/2 DUE TO BACK PAIN AND IS CALLING TO ASK IF PRETTY WOULD WRITE HER A WORK EXCUSE - SHE GAVE HER ONE PREVIOUSLY FOR THE PAIN PLEASE ADVISE HER

## 2022-06-04 LAB
BACTERIA UR CULT: ABNORMAL
BACTERIA UR CULT: ABNORMAL
OTHER ANTIBIOTIC SUSC ISLT: ABNORMAL

## 2022-06-06 DIAGNOSIS — N30.01 ACUTE CYSTITIS WITH HEMATURIA: Primary | ICD-10-CM

## 2022-06-06 NOTE — TELEPHONE ENCOUNTER
Rebecca,  I sent a result note with her urine cx results to you, but yes, this is fine she can have another work excuse for this day.     Thanks!

## 2022-06-07 ENCOUNTER — TELEPHONE (OUTPATIENT)
Dept: OBSTETRICS AND GYNECOLOGY | Facility: CLINIC | Age: 52
End: 2022-06-07

## 2022-06-07 RX ORDER — PHENAZOPYRIDINE HYDROCHLORIDE 200 MG/1
200 TABLET, FILM COATED ORAL 3 TIMES DAILY PRN
Qty: 9 TABLET | Refills: 0 | Status: SHIPPED | OUTPATIENT
Start: 2022-06-07

## 2022-06-07 RX ORDER — FLUCONAZOLE 150 MG/1
TABLET ORAL
Qty: 3 TABLET | Refills: 0 | Status: SHIPPED | OUTPATIENT
Start: 2022-06-07

## 2022-06-07 NOTE — TELEPHONE ENCOUNTER
Having vag itch with discharge.  Still having urinary freq.  Pain is some better.  Out of pyridium.  Still has 1 day of macrobid left.  Still not all the way better.

## 2022-06-07 NOTE — TELEPHONE ENCOUNTER
Rebecca,  I will send in diflucan for her for the yeast infx and I will send in 3 more days of pyridium as well.  Be sure she completes the macrobid and then instead of waiting a wk, let's have her drop off a urine sample on Friday to retest - I have already put the order in for the urine cx.      Thanks!

## 2022-06-17 ENCOUNTER — LAB (OUTPATIENT)
Dept: LAB | Facility: HOSPITAL | Age: 52
End: 2022-06-17

## 2022-06-17 DIAGNOSIS — N30.01 ACUTE CYSTITIS WITH HEMATURIA: ICD-10-CM

## 2022-06-19 LAB
BACTERIA UR CULT: NORMAL
BACTERIA UR CULT: NORMAL

## 2022-06-21 ENCOUNTER — TELEPHONE (OUTPATIENT)
Dept: OBSTETRICS AND GYNECOLOGY | Facility: CLINIC | Age: 52
End: 2022-06-21

## 2022-08-15 ENCOUNTER — OFFICE VISIT (OUTPATIENT)
Dept: NEUROLOGY | Facility: CLINIC | Age: 52
End: 2022-08-15

## 2022-08-15 VITALS
WEIGHT: 124.6 LBS | SYSTOLIC BLOOD PRESSURE: 124 MMHG | HEIGHT: 63 IN | OXYGEN SATURATION: 99 % | DIASTOLIC BLOOD PRESSURE: 82 MMHG | HEART RATE: 59 BPM | BODY MASS INDEX: 22.08 KG/M2

## 2022-08-15 DIAGNOSIS — R25.1 TREMOR: Primary | ICD-10-CM

## 2022-08-15 DIAGNOSIS — F41.9 ANXIETY: ICD-10-CM

## 2022-08-15 PROBLEM — R51.9 CHRONIC NONINTRACTABLE HEADACHE: Status: ACTIVE | Noted: 2022-08-15

## 2022-08-15 PROBLEM — G89.29 CHRONIC NONINTRACTABLE HEADACHE: Status: ACTIVE | Noted: 2022-08-15

## 2022-08-15 PROCEDURE — 99214 OFFICE O/P EST MOD 30 MIN: CPT | Performed by: NURSE PRACTITIONER

## 2022-08-15 RX ORDER — ROPINIROLE 0.5 MG/1
0.5 TABLET, FILM COATED ORAL 3 TIMES DAILY
Qty: 90 TABLET | Refills: 2 | Status: SHIPPED | OUTPATIENT
Start: 2022-08-15 | End: 2022-08-23

## 2022-08-15 NOTE — PROGRESS NOTES
Neuro Office Visit      Encounter Date: 08/15/2022   Patient Name: Bia Badillo  : 1970   MRN: [id number]   PCP: Dr Nova  Chief Complaint:    Chief Complaint   Patient presents with   • Tremors       History of Present Illness: Bia Badillo is a 52 y.o. female who is here today in Neurology for tremors, anxiety, depression..    Last visit 22 w me-check labs, start Propranolol  Labs: CK, mag, TSH,  Vit B12 > 1900, eGFR 53    Tremor  Having side effects of decreased vaginal dryness, decreased libido, and hair falling out. Wants to stop beta blocker.  Is already taking TPM for headache. On Latuda which has a drug interaction with mysoline.    PH  Gradual onset . Whole body tremor brought on by anxiety. Tx'd w propranolol w good results. Worse if she skips meds. Worried about weight gain.  Denies unilateral tremor at rest. No voice tremor. No one else can see her tremor. Hx of one visual hallucination as seeing a cat. Denies trouble rollingt over in bed, change in gait or falls. Auditory hallucinations are bells and whistles.  Poor diet due to nausea. She skips meals. Taking Ozempic for weight loss.  Taking Adderal for ADHD.  Taking TPM for HA which is controlled.  Taking Latuda for insomnia    Anxiety  Seeing a therapist. Taking Adderall and Latudua      FH MGF w tremor. No known members with PD.     Labs with renal insuff. TSH normal  Subjective      Past Medical History:   Past Medical History:   Diagnosis Date   • ADHD    • Anxiety    • Depression    • Dyspareunia, female 2019   • Hypertension    • Insulin resistance 10/4/2021    TRAMAINE- IR score 2.96-testing glucose 85, fasting insulin 14.1 (labs 2021)   • Menopausal symptoms 2018   • Vitamin D insufficiency 10/4/2021    Labs 2021 Rx 10/2021: 50,000 with 2000IU daily after.       Past Surgical History:   Past Surgical History:   Procedure Laterality Date   • BREAST IMPLANT SURGERY     • LEEP      Mild dysplasia        Family History:   Family History   Problem Relation Age of Onset   • Breast cancer Mother    • No Known Problems Father    • Colon polyps Sister    • Pancreatitis Brother    • Alcohol abuse Brother    • Hypertension Brother    • Colon polyps Sister    • Drug abuse Daughter    • Tremor Maternal Grandfather        Social History:   Social History     Socioeconomic History   • Marital status:    Tobacco Use   • Smoking status: Never Smoker   • Smokeless tobacco: Never Used   • Tobacco comment: patient states if she is stressed she might have one   Vaping Use   • Vaping Use: Never used   Substance and Sexual Activity   • Alcohol use: Never   • Drug use: No   • Sexual activity: Not Currently     Partners: Male     Birth control/protection: Post-menopausal       Medications:     Current Outpatient Medications:   •  amphetamine-dextroamphetamine (ADDERALL) 10 MG tablet, TK 1 T PO TID, Disp: , Rfl:   •  amphetamine-dextroamphetamine (ADDERALL) 30 MG tablet, Take 1 tablet by mouth 2 (Two) Times a Day., Disp: , Rfl: 0  •  chlorzoxazone (PARAFON FORTE) 500 MG tablet, TK 1 T PO UP TO TID PRF MSP OR PAIN, Disp: , Rfl:   •  Cholecalciferol (Vitamin D3) 50 MCG (2000 UT) capsule, TAKE 1 CAPSULE BY MOUTH DAILY  DAYS, Disp: , Rfl:   •  estradiol (Estrace) 0.5 MG tablet, Take 1 tablet by mouth Daily., Disp: 30 tablet, Rfl: 11  •  Latuda 40 MG tablet tablet, , Disp: , Rfl:   •  meloxicam (MOBIC) 15 MG tablet, Take 15 mg by mouth Daily., Disp: , Rfl:   •  Progesterone (PROMETRIUM) 200 MG capsule, Take 1 capsule by mouth Daily., Disp: 30 capsule, Rfl: 11  •  tiZANidine (ZANAFLEX) 4 MG tablet, TAKE 1 TABLET BY MOUTH EVERY DAY AS NEEDED FOR MUSCLE/BACK PAIN, Disp: , Rfl:   •  topiramate (TOPAMAX) 200 MG tablet, Take  by mouth., Disp: , Rfl:   •  buPROPion XL (WELLBUTRIN XL) 150 MG 24 hr tablet, , Disp: , Rfl:   •  fluconazole (Diflucan) 150 MG tablet, Take 1 tablet po qod x 3 doses., Disp: 3 tablet, Rfl: 0  •  meloxicam  "(MOBIC) 7.5 MG tablet, Take 7.5 mg by mouth Daily., Disp: , Rfl:   •  phenazopyridine (Pyridium) 200 MG tablet, Take 1 tablet by mouth 3 (Three) Times a Day As Needed for Bladder Spasms., Disp: 9 tablet, Rfl: 0  •  rOPINIRole (Requip) 0.5 MG tablet, Take 1 tablet by mouth 3 (Three) Times a Day. Take 1 hour before bedtime., Disp: 90 tablet, Rfl: 2    Allergies:   No Known Allergies    PHQ-9 Total Score:     STEADI Fall Risk Assessment has not been completed.    Objective     Physical Exam:   Physical Exam  Neurological:      Mental Status: She is oriented to person, place, and time.      Coordination: Finger-Nose-Finger Test normal.      Gait: Gait is intact.   Psychiatric:         Speech: Speech normal.         Neurologic Exam     Mental Status   Oriented to person, place, and time.   Follows 3 step commands.   Attention: normal. Concentration: normal.   Speech: speech is normal   Level of consciousness: alert  Knowledge: consistent with education.   Normal comprehension.     Cranial Nerves     CN III, IV, VI   Nystagmus: none   Upgaze: normal  Downgaze: normal  Conjugate gaze: present    CN VIII   Hearing: intact    Motor Exam   Muscle bulk: normal  Overall muscle tone: normal    Strength   Right biceps: 5/5  Left biceps: 5/5  Right triceps: 5/5  Left triceps: 5/5  Right interossei: 5/5  Left interossei: 5/5  Right quadriceps: 5/5  Left quadriceps: 5/5  Right anterior tibial: 5/5  Left anterior tibial: 5/5  Right posterior tibial: 5/5  Left posterior tibial: 5/5    Sensory Exam   Light touch normal.     Gait, Coordination, and Reflexes     Gait  Gait: normal    Coordination   Finger to nose coordination: normal    Tremor   Resting tremor: absent  Action tremor: right arm       Vital Signs:   Vitals:    08/15/22 1128   BP: 124/82   Pulse: 59   SpO2: 99%   Weight: 56.5 kg (124 lb 9.6 oz)   Height: 160 cm (62.99\")     Body mass index is 22.08 kg/m².         Assessment / Plan      Assessment/Plan:   Diagnoses and all " orders for this visit:    1. Tremor (Primary)    2. Anxiety  Comments:  Follow up with therapist    Other orders  -     rOPINIRole (Requip) 0.5 MG tablet; Take 1 tablet by mouth 3 (Three) Times a Day. Take 1 hour before bedtime.  Dispense: 90 tablet; Refill: 2       Will titrate slowly with 1/2 tab q hs x 2 weeks, then 1/2 bid x 2 weeks, then 1/2 tid if needed.    Patient Education:   Reviewed side effects    Reviewed medications, potential side effects and signs and symptoms to report. Discussed risk versus benefits of treatment plan with patient and/or family-including medications, labs and radiology that may be ordered. Addressed questions and concerns during visit. Patient and/or family verbalized understanding and agree with plan. Instructed to call the office with any questions and report to ER with any life-threatening symptoms.     Follow Up:   Return in about 3 months (around 11/15/2022).    During this visit the following were done:  Labs Reviewed []    Labs Ordered []    Radiology Reports Reviewed []    Radiology Ordered []    PCP Records Reviewed []    Referring Provider Records Reviewed []    ER Records Reviewed []    Hospital Records Reviewed []    History Obtained From Family []    Radiology Images Reviewed []    Other Reviewed [x]    Records Requested []      Cleo Carmona, BOYD, APRN

## 2022-08-23 ENCOUNTER — TELEPHONE (OUTPATIENT)
Dept: NEUROLOGY | Facility: CLINIC | Age: 52
End: 2022-08-23

## 2022-08-23 DIAGNOSIS — R25.1 TREMOR: Primary | ICD-10-CM

## 2022-08-23 RX ORDER — PROPRANOLOL HYDROCHLORIDE 40 MG/1
40 TABLET ORAL 3 TIMES DAILY
Qty: 90 TABLET | Refills: 5 | Status: SHIPPED | OUTPATIENT
Start: 2022-08-23

## 2022-08-23 NOTE — TELEPHONE ENCOUNTER
Caller: Bia Badillo    Relationship: Self    Best call back number:[phone]  What medications are you currently taking:   Current Outpatient Medications on File Prior to Visit   Medication Sig Dispense Refill   • amphetamine-dextroamphetamine (ADDERALL) 10 MG tablet TK 1 T PO TID     • amphetamine-dextroamphetamine (ADDERALL) 30 MG tablet Take 1 tablet by mouth 2 (Two) Times a Day.  0   • buPROPion XL (WELLBUTRIN XL) 150 MG 24 hr tablet      • chlorzoxazone (PARAFON FORTE) 500 MG tablet TK 1 T PO UP TO TID PRF MSP OR PAIN     • Cholecalciferol (Vitamin D3) 50 MCG (2000 UT) capsule TAKE 1 CAPSULE BY MOUTH DAILY  DAYS     • estradiol (Estrace) 0.5 MG tablet Take 1 tablet by mouth Daily. 30 tablet 11   • fluconazole (Diflucan) 150 MG tablet Take 1 tablet po qod x 3 doses. 3 tablet 0   • Latuda 40 MG tablet tablet      • meloxicam (MOBIC) 15 MG tablet Take 15 mg by mouth Daily.     • meloxicam (MOBIC) 7.5 MG tablet Take 7.5 mg by mouth Daily.     • phenazopyridine (Pyridium) 200 MG tablet Take 1 tablet by mouth 3 (Three) Times a Day As Needed for Bladder Spasms. 9 tablet 0   • Progesterone (PROMETRIUM) 200 MG capsule Take 1 capsule by mouth Daily. 30 capsule 11   • rOPINIRole (Requip) 0.5 MG tablet Take 1 tablet by mouth 3 (Three) Times a Day. Take 1 hour before bedtime. 90 tablet 2   • tiZANidine (ZANAFLEX) 4 MG tablet TAKE 1 TABLET BY MOUTH EVERY DAY AS NEEDED FOR MUSCLE/BACK PAIN     • topiramate (TOPAMAX) 200 MG tablet Take  by mouth.       No current facility-administered medications on file prior to visit.          When did you start taking these medications: N/A    Which medication are you concerned about: ROPINIROLE AND PROPRANOLOL    Who prescribed you this medication: TAMIKA HEWITT    What are your concerns: PATIENT STATES THAT THE ROPINIROLE IS NOT WORKING AND IS REQUESTING TO BE PUT BACK ON PROPRANOLOL 40 MG 1 TABLET BY MOUTH 3 TIMES DAILY INSTEAD OF 2 TIMES DAILY    PLEASE ADVISE    IF  RX CHANGE IS APPROVED PLEASE SEND RX TO [facility] #[id number]    How long have you had these concerns: N/A

## 2022-11-05 RX ORDER — ACETAMINOPHEN 160 MG
TABLET,DISINTEGRATING ORAL
Qty: 90 CAPSULE | OUTPATIENT
Start: 2022-11-05

## 2023-02-27 RX ORDER — ESTRADIOL 0.5 MG/1
0.5 TABLET ORAL DAILY
Qty: 30 TABLET | Refills: 0 | Status: SHIPPED | OUTPATIENT
Start: 2023-02-27 | End: 2023-03-27

## 2023-02-27 NOTE — TELEPHONE ENCOUNTER
Rx Refill Note  Patient needs appointment.  Will OK 30 day prescription with no refills and will send note to pharmacy that patient must schedule appointment.  Requested Prescriptions     Pending Prescriptions Disp Refills   • estradiol (ESTRACE) 0.5 MG tablet [Pharmacy Med Name: ESTRADIOL 0.5MG TABLETS] 30 tablet 11     Sig: TAKE 1 TABLET BY MOUTH DAILY      Last office visit with prescribing clinician: 5/31/2022   Last telemedicine visit with prescribing clinician: Visit date not found   Next office visit with prescribing clinician: Visit date not found                         Would you like a call back once the refill request has been completed: [] Yes [] No    If the office needs to give you a call back, can they leave a voicemail: [] Yes [] No    Carline Juarez MA  02/27/23, 12:52 EST

## 2023-03-02 RX ORDER — PROGESTERONE 200 MG/1
200 CAPSULE ORAL DAILY
Qty: 30 CAPSULE | Refills: 4 | Status: SHIPPED | OUTPATIENT
Start: 2023-03-02

## 2023-03-02 NOTE — TELEPHONE ENCOUNTER
Called to inquire with patient if Rx is needed as it is an automated request, she confirmed she is in need of refill.

## 2023-03-27 RX ORDER — ESTRADIOL 0.5 MG/1
0.5 TABLET ORAL DAILY
Qty: 30 TABLET | Refills: 0 | Status: SHIPPED | OUTPATIENT
Start: 2023-03-27

## 2023-04-26 RX ORDER — ESTRADIOL 0.5 MG/1
0.5 TABLET ORAL DAILY
Qty: 30 TABLET | Refills: 0 | Status: SHIPPED | OUTPATIENT
Start: 2023-04-26

## 2023-06-09 RX ORDER — ESTRADIOL 1 MG/1
1 TABLET ORAL DAILY
Qty: 30 TABLET | Refills: 0 | OUTPATIENT
Start: 2023-06-09

## 2023-06-09 NOTE — TELEPHONE ENCOUNTER
Rx Refill Note  Incorrect dose, and patient has not had annual exam since 2/2022.  Needs appointment.  Requested Prescriptions     Refused Prescriptions Disp Refills    estradiol (ESTRACE) 1 MG tablet [Pharmacy Med Name: ESTRADIOL 1MG TABLETS] 30 tablet 0     Sig: Take 1 tablet by mouth Daily.      Last office visit with prescribing clinician: 5/31/2022   Last telemedicine visit with prescribing clinician: Visit date not found   Next office visit with prescribing clinician: Visit date not found                         Would you like a call back once the refill request has been completed: [] Yes [] No    If the office needs to give you a call back, can they leave a voicemail: [] Yes [] No    Carline Juarez MA  06/09/23, 14:36 EDTRx Refill Note  Requested Prescriptions     Refused Prescriptions Disp Refills    estradiol (ESTRACE) 1 MG tablet [Pharmacy Med Name: ESTRADIOL 1MG TABLETS] 30 tablet 0     Sig: Take 1 tablet by mouth Daily.      Last office visit with prescribing clinician: 5/31/2022   Last telemedicine visit with prescribing clinician: Visit date not found   Next office visit with prescribing clinician: Visit date not found                         Would you like a call back once the refill request has been completed: [] Yes [] No    If the office needs to give you a call back, can they leave a voicemail: [] Yes [] No    Carline Juarez MA  06/09/23, 14:36 EDTRx Refill Note  Requested Prescriptions     Refused Prescriptions Disp Refills    estradiol (ESTRACE) 1 MG tablet [Pharmacy Med Name: ESTRADIOL 1MG TABLETS] 30 tablet 0     Sig: Take 1 tablet by mouth Daily.      Last office visit with prescribing clinician: 5/31/2022   Last telemedicine visit with prescribing clinician: Visit date not found   Next office visit with prescribing clinician: Visit date not found                         Would you like a call back once the refill request has been completed: [] Yes [] No    If the office needs to give you a  call back, can they leave a voicemail: [] Yes [] No    Carline Juarez MA  06/09/23, 14:36 EDTRx Refill Note  Requested Prescriptions     Refused Prescriptions Disp Refills    estradiol (ESTRACE) 1 MG tablet [Pharmacy Med Name: ESTRADIOL 1MG TABLETS] 30 tablet 0     Sig: Take 1 tablet by mouth Daily.      Last office visit with prescribing clinician: 5/31/2022   Last telemedicine visit with prescribing clinician: Visit date not found   Next office visit with prescribing clinician: Visit date not found                         Would you like a call back once the refill request has been completed: [] Yes [] No    If the office needs to give you a call back, can they leave a voicemail: [] Yes [] No    Carline Juarez MA  06/09/23, 14:36 EDTRx Refill Note  Requested Prescriptions     Refused Prescriptions Disp Refills    estradiol (ESTRACE) 1 MG tablet [Pharmacy Med Name: ESTRADIOL 1MG TABLETS] 30 tablet 0     Sig: Take 1 tablet by mouth Daily.      Last office visit with prescribing clinician: 5/31/2022   Last telemedicine visit with prescribing clinician: Visit date not found   Next office visit with prescribing clinician: Visit date not found                         Would you like a call back once the refill request has been completed: [] Yes [] No    If the office needs to give you a call back, can they leave a voicemail: [] Yes [] No    Carline Juarez MA  06/09/23, 14:36 EDT

## 2023-07-27 RX ORDER — ESTRADIOL 0.5 MG/1
0.5 TABLET ORAL DAILY
Qty: 30 TABLET | Refills: 0 | Status: SHIPPED | OUTPATIENT
Start: 2023-07-27

## 2023-07-27 NOTE — TELEPHONE ENCOUNTER
Caller: Bia Badillo    Relationship: Self    Best call back number: [phone]    Requested Prescriptions:   Requested Prescriptions     Pending Prescriptions Disp Refills    estradiol (ESTRACE) 0.5 MG tablet 30 tablet 0     Sig: Take 1 tablet by mouth Daily.        Pharmacy where request should be sent:      Last office visit with prescribing clinician: 5/31/22 W/ [facility]ESS[facility]AS  Last telemedicine visit with prescribing clinician: Visit date not found   Next office visit with prescribing clinician: 8/3/2023 W/ JESSICA APODACA    Additional details provided by patient: NEEDS REFILL UNTIL NEXT APPT - SCHED FOR 8/3/23 FOR ANNUAL    Does the patient have less than a 3 day supply:  [x] Yes  [] No    Would you like a call back once the refill request has been completed: [x] Yes [] No

## 2023-08-03 ENCOUNTER — OFFICE VISIT (OUTPATIENT)
Dept: OBSTETRICS AND GYNECOLOGY | Facility: CLINIC | Age: 53
End: 2023-08-03
Payer: MEDICAID

## 2023-08-03 VITALS
HEIGHT: 63 IN | BODY MASS INDEX: 22.79 KG/M2 | DIASTOLIC BLOOD PRESSURE: 76 MMHG | WEIGHT: 128.6 LBS | SYSTOLIC BLOOD PRESSURE: 130 MMHG

## 2023-08-03 DIAGNOSIS — Z01.419 ENCOUNTER FOR GYNECOLOGICAL EXAMINATION WITHOUT ABNORMAL FINDING: Primary | ICD-10-CM

## 2023-08-03 DIAGNOSIS — Z12.31 ENCOUNTER FOR SCREENING MAMMOGRAM FOR MALIGNANT NEOPLASM OF BREAST: ICD-10-CM

## 2023-08-03 DIAGNOSIS — Z12.31 BREAST CANCER SCREENING BY MAMMOGRAM: ICD-10-CM

## 2023-08-03 DIAGNOSIS — Z79.890 POSTMENOPAUSAL HORMONE REPLACEMENT THERAPY: ICD-10-CM

## 2023-08-03 DIAGNOSIS — Z12.11 SCREENING FOR COLON CANCER: ICD-10-CM

## 2023-08-03 RX ORDER — LURASIDONE HYDROCHLORIDE 80 MG/1
80 TABLET, FILM COATED ORAL DAILY
COMMUNITY
Start: 2023-07-25

## 2023-08-03 RX ORDER — PROGESTERONE 200 MG/1
200 CAPSULE ORAL DAILY
Qty: 30 CAPSULE | Refills: 11 | Status: SHIPPED | OUTPATIENT
Start: 2023-08-03

## 2023-08-03 RX ORDER — ESTRADIOL 0.5 MG/1
0.5 TABLET ORAL DAILY
Qty: 30 TABLET | Refills: 11 | Status: SHIPPED | OUTPATIENT
Start: 2023-08-03

## 2023-08-04 LAB — REF LAB TEST METHOD: NORMAL

## 2024-08-09 RX ORDER — PROGESTERONE 200 MG/1
200 CAPSULE ORAL DAILY
Qty: 30 CAPSULE | Refills: 0 | Status: SHIPPED | OUTPATIENT
Start: 2024-08-09

## 2024-08-09 RX ORDER — ESTRADIOL 0.5 MG/1
0.5 TABLET ORAL DAILY
Qty: 30 TABLET | Refills: 0 | Status: SHIPPED | OUTPATIENT
Start: 2024-08-09

## 2024-08-09 NOTE — TELEPHONE ENCOUNTER
Rx Refill Note  Fax refill request received from the pharmacy.  Appointment with Kita 8/13/24.  Requested Prescriptions      No prescriptions requested or ordered in this encounter      Last office visit with prescribing clinician: 8/3/2023   Last telemedicine visit with prescribing clinician: Visit date not found   Next office visit with prescribing clinician: 8/13/2024                         Would you like a call back once the refill request has been completed: [] Yes [] No    If the office needs to give you a call back, can they leave a voicemail: [] Yes [] No    Carline Juarez MA  08/09/24, 07:55 EDT

## 2024-10-07 ENCOUNTER — TELEPHONE (OUTPATIENT)
Dept: OBSTETRICS AND GYNECOLOGY | Facility: CLINIC | Age: 54
End: 2024-10-07
Payer: MEDICAID

## 2024-10-07 ENCOUNTER — TELEPHONE (OUTPATIENT)
Dept: OBSTETRICS AND GYNECOLOGY | Facility: CLINIC | Age: 54
End: 2024-10-07

## 2024-10-07 DIAGNOSIS — Z12.31 ENCOUNTER FOR SCREENING MAMMOGRAM FOR MALIGNANT NEOPLASM OF BREAST: Primary | ICD-10-CM

## 2024-10-07 RX ORDER — ESTRADIOL 0.5 MG/1
0.5 TABLET ORAL DAILY
Qty: 30 TABLET | Refills: 2 | Status: SHIPPED | OUTPATIENT
Start: 2024-10-07

## 2024-10-07 RX ORDER — PROGESTERONE 200 MG/1
200 CAPSULE ORAL DAILY
Qty: 30 CAPSULE | Refills: 2 | Status: SHIPPED | OUTPATIENT
Start: 2024-10-07

## 2024-10-07 RX ORDER — ESTRADIOL 0.5 MG/1
0.5 TABLET ORAL DAILY
Qty: 30 TABLET | Refills: 0 | OUTPATIENT
Start: 2024-10-07

## 2024-10-07 RX ORDER — PROGESTERONE 200 MG/1
200 CAPSULE ORAL DAILY
Qty: 30 CAPSULE | Refills: 0 | OUTPATIENT
Start: 2024-10-07

## 2024-10-07 NOTE — TELEPHONE ENCOUNTER
Caller: Bia Badillo    Relationship: Self    Best call back number: [phone]      Who are you requesting to speak with (clinical staff, JESSICA APODACA      What was the call regarding: PATIENT WOULD LIKE AN ORDER TO A MAMMOGRAM SENT TO  [address]  [id number] [phone]

## 2024-10-07 NOTE — TELEPHONE ENCOUNTER
Caller: Bia Badillo    Relationship: Self    Best call back number: [phone]    Requested Prescriptions:   Requested Prescriptions     Pending Prescriptions Disp Refills    Progesterone (PROMETRIUM) 200 MG capsule 30 capsule 0     Sig: Take 1 capsule by mouth Daily.    estradiol (ESTRACE) 0.5 MG tablet 30 tablet 0     Sig: Take 1 tablet by mouth Daily.        Pharmacy where request should be sent:  [facility]   [address],    Last office visit with prescribing clinician: 8/3/2023   Last telemedicine visit with prescribing clinician: Visit date not found   Next office visit with prescribing clinician: Visit date not found     Additional details provided by patient: COMPLETELY OUT OF BOTH    Does the patient have less than a 3 day supply:  [x] Yes  [] No    Would you like a call back once the refill request has been completed: [x] Yes [] No    If the office needs to give you a call back, can they leave a voicemail: [x] Yes [] No    Bro Monsalve Rep   10/07/24 14:16 EDT

## 2024-12-12 ENCOUNTER — TELEPHONE (OUTPATIENT)
Dept: OBSTETRICS AND GYNECOLOGY | Facility: CLINIC | Age: 54
End: 2024-12-12
Payer: MEDICAID

## 2024-12-12 RX ORDER — ESTRADIOL 0.5 MG/1
0.5 TABLET ORAL DAILY
Qty: 30 TABLET | Refills: 1 | Status: SHIPPED | OUTPATIENT
Start: 2024-12-12

## 2024-12-12 RX ORDER — PROGESTERONE 200 MG/1
200 CAPSULE ORAL DAILY
Qty: 30 CAPSULE | Refills: 1 | Status: SHIPPED | OUTPATIENT
Start: 2024-12-12

## 2024-12-12 NOTE — TELEPHONE ENCOUNTER
Pt called and she is a previous [facility] pt. She has been without her hormone medications for about 2 wks. Pt has an upcoming annual with Dr. Ghosh 1/22/25.    Can we refill her Estrdiol and her Progesterone for her?     [address]    Thank you

## 2024-12-12 NOTE — TELEPHONE ENCOUNTER
Prescription sent to listed pharmacy with enough refills to cover her until Jan appt.     Rafaela Ghosh MD

## 2025-05-29 RX ORDER — PROGESTERONE 200 MG/1
200 CAPSULE ORAL DAILY
Qty: 30 CAPSULE | Refills: 0 | Status: SHIPPED | OUTPATIENT
Start: 2025-05-29

## 2025-05-29 RX ORDER — ESTRADIOL 0.5 MG/1
0.5 TABLET ORAL DAILY
Qty: 30 TABLET | Refills: 0 | Status: SHIPPED | OUTPATIENT
Start: 2025-05-29

## 2025-06-12 ENCOUNTER — OFFICE VISIT (OUTPATIENT)
Age: 55
End: 2025-06-12
Payer: COMMERCIAL

## 2025-06-12 VITALS
BODY MASS INDEX: 21.79 KG/M2 | SYSTOLIC BLOOD PRESSURE: 122 MMHG | DIASTOLIC BLOOD PRESSURE: 80 MMHG | WEIGHT: 123 LBS | RESPIRATION RATE: 16 BRPM

## 2025-06-12 DIAGNOSIS — N39.3 SUI (STRESS URINARY INCONTINENCE, FEMALE): ICD-10-CM

## 2025-06-12 DIAGNOSIS — Z01.419 WELL WOMAN EXAM WITH ROUTINE GYNECOLOGICAL EXAM: Primary | ICD-10-CM

## 2025-06-12 DIAGNOSIS — Z12.31 SCREENING MAMMOGRAM FOR BREAST CANCER: ICD-10-CM

## 2025-06-12 DIAGNOSIS — R10.2 PELVIC PAIN: ICD-10-CM

## 2025-06-12 RX ORDER — PROGESTERONE 200 MG/1
200 CAPSULE ORAL DAILY
Qty: 90 CAPSULE | Refills: 4 | Status: SHIPPED | OUTPATIENT
Start: 2025-06-12

## 2025-06-12 RX ORDER — FLUOXETINE HYDROCHLORIDE 40 MG/1
2 CAPSULE ORAL DAILY
COMMUNITY
Start: 2025-05-29

## 2025-06-12 RX ORDER — FOLIC ACID 0.4 MG
400 TABLET ORAL DAILY
COMMUNITY

## 2025-06-12 RX ORDER — IBUPROFEN 800 MG/1
800 TABLET, FILM COATED ORAL EVERY 8 HOURS PRN
COMMUNITY
Start: 2025-05-13

## 2025-06-12 RX ORDER — ESTRADIOL 0.5 MG/1
0.5 TABLET ORAL DAILY
Qty: 90 TABLET | Refills: 4 | Status: SHIPPED | OUTPATIENT
Start: 2025-06-12

## 2025-06-12 NOTE — PROGRESS NOTES
Subjective   Chief Complaint   Patient presents with    Annual Exam     A lot of abd cramping       Bia Badillo is a 54 y.o. year old  menopausal female presenting to be seen for her annual exam.  This past year she has been on hormone replacement therapy.  There has not been vaginal bleeding in the last 12 months.  Menopausal symptoms are not present. Symptoms are well controlled with HRT dose.     SEXUAL Hx:  She is not currently sexually active.  In the past year there she has not been sexually active.    Condoms are not needed because she is not sexually active.  She would not like to be screened for STD's at today's exam.  Luquillo is painful: n/a  HEALTH Hx:  She exercises regularly: yes.  She wears her seat belt: yes.  She has concerns about domestic violence: no.  She has noticed changes in height: no.  OTHER THINGS SHE WANTS TO DISCUSS TODAY:  She reports a pain that has started rather recently in her low pelvic region- she first noticed this about 6 months ago. 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 She endorses having back pain frequently. She reports heat helps this pain as well as ibuprofen. She has not noticed any aggravating factors. She reports there does not seem to be a pattern to this pain, but does endorse feeling it every day at various times. It is increasing in frequency. She has no associated bleeding or discharge from this.      The following portions of the patient's history were reviewed and updated as appropriate:problem list, current medications, allergies, past family history, past medical history, past social history, and past surgical history.    Social History    Tobacco Use      Smoking status: Never      Smokeless tobacco: Never      Tobacco comments: patient states if she is stressed she might have one    Past Medical History:   Diagnosis Date    ADHD     Anxiety     Depression     Dyspareunia, female 2019     Fibromyalgia     Hypertension     Insulin resistance 10/04/2021    TRAMAINE- IR score 2.96-testing glucose 85, fasting insulin 14.1 (labs 9/2021)    Menopausal symptoms 11/26/2018    Post-menopause on HRT (hormone replacement therapy)     Vitamin D insufficiency 10/04/2021    Labs 9/2021 Rx 10/2021: 50,000 with 2000IU daily after.     Past Surgical History:   Procedure Laterality Date    BREAST IMPLANT SURGERY      LEEP  1994    Mild dysplasia         Review of Systems   Constitutional: Negative.  Negative for appetite change and diaphoresis.   Respiratory: Negative.     Cardiovascular: Negative.    Gastrointestinal: Negative.  Negative for abdominal distention, blood in stool, GERD and indigestion.   Endocrine: Negative.    Genitourinary:  Positive for pelvic pain and urinary incontinence. Negative for breast discharge, breast lump, breast pain, dysuria and hematuria.   Skin: Negative.           Objective   /80   Resp 16   Wt 55.8 kg (123 lb)   BMI 21.79 kg/m²     Physical Exam  Vitals and nursing note reviewed. Exam conducted with a chaperone present.   Constitutional:       Appearance: Normal appearance.   Cardiovascular:      Rate and Rhythm: Normal rate and regular rhythm.      Heart sounds: Normal heart sounds.   Pulmonary:      Effort: Pulmonary effort is normal.      Breath sounds: Normal breath sounds.   Chest:   Breasts:     Right: Normal.      Left: Normal.   Abdominal:      Palpations: Abdomen is soft.   Genitourinary:     General: Normal vulva.      Exam position: Lithotomy position.      Vagina: Normal.      Cervix: Normal.      Uterus: Normal.       Adnexa: Right adnexa normal and left adnexa normal.      Rectum: Normal.      Comments: No tenderness noted on bimanual exam  Digital rectal exam not done but appears normal visually  Neurological:      Mental Status: She is alert and oriented to person, place, and time.   Psychiatric:         Mood and Affect: Mood normal.         Behavior: Behavior  normal.         Thought Content: Thought content normal.         Judgment: Judgment normal.            Diagnoses and all orders for this visit:    1. Well woman exam with routine gynecological exam (Primary)    2. RAVINDER (stress urinary incontinence, female)    3. Screening mammogram for breast cancer  -     Mammo Screening Digital Tomosynthesis Bilateral With CAD; Future    4. Pelvic pain  -     US Non-ob Transvaginal; Future    Other orders  -     estradiol (ESTRACE) 0.5 MG tablet; Take 1 tablet by mouth Daily.  Dispense: 90 tablet; Refill: 4  -     Progesterone (PROMETRIUM) 200 MG capsule; Take 1 capsule by mouth Daily.  Dispense: 90 capsule; Refill: 4    Pap was not done today.  I explained to Bia that the recommendations for Pap smear interval in a low risk patient has lengthened to 3 years time.  I told Bia she still needs to be seen in our office yearly for a full physical including breast and pelvic exam.    Discussed pelvic discomfort- will have her back for u/s to assess if discomfort appears to be of a gynecologic nature.    Discussed RAVINDER- symptoms mostly seem to be related to not voiding frequently enough at work as she gets busy. Discussed lifestyle modifications for this, and encouraged her to take bathroom breaks at least every 3-4 hours/prn. She voiced understanding.      She is up to date on all relevant gynecologic and colorectal screenings except mammography- order placed    The importance of keeping all planned follow-up and taking all medications as prescribed was emphasized.    Today I discussed with Bia the total recommended calcium intake for a post-menopausal female is 1200 mg.  Ideally this should be from dietary sources.  I reviewed calcium content in various foods including milk, fortified orange juice and yogurt.  If she cannot get sufficient calcium through dietary means, it is recommended to supplement with either a multivitamin or calcium to reach her daily goal.  I also  reviewed the difference in the bioavailability of calcium carbonate and calcium citrate containing supplements and the importance of taking calcium carbonate containing products with food. Finally, vitamin D's role in calcium absorption was reviewed and a total daily vitamin D intake of 600 units was recommended.    Her vaccine record was reviewed and updated.    I discussed with Bia that she may be behind on needed vaccinations for COVID booster / COVID bivalent booster.  She may be able to obtain these vaccinations at her local pharmacy OR speak about obtaining them with her primary care.  If she does obtain her vaccines, I have asked Bia to let us know the date each vaccine was obtained so that her medical record could be updated in our system.    New Medications Ordered This Visit   Medications    estradiol (ESTRACE) 0.5 MG tablet     Sig: Take 1 tablet by mouth Daily.     Dispense:  90 tablet     Refill:  4    Progesterone (PROMETRIUM) 200 MG capsule     Sig: Take 1 capsule by mouth Daily.     Dispense:  90 capsule     Refill:  4            No follow-ups on file.    Unique Donald, APRN  June 13, 2025